# Patient Record
Sex: MALE | Race: WHITE | NOT HISPANIC OR LATINO | Employment: FULL TIME | URBAN - METROPOLITAN AREA
[De-identification: names, ages, dates, MRNs, and addresses within clinical notes are randomized per-mention and may not be internally consistent; named-entity substitution may affect disease eponyms.]

---

## 2017-05-07 ENCOUNTER — HOSPITAL ENCOUNTER (EMERGENCY)
Facility: MEDICAL CENTER | Age: 52
End: 2017-05-07
Attending: EMERGENCY MEDICINE
Payer: COMMERCIAL

## 2017-05-07 VITALS
RESPIRATION RATE: 18 BRPM | OXYGEN SATURATION: 91 % | TEMPERATURE: 98 F | BODY MASS INDEX: 42.28 KG/M2 | SYSTOLIC BLOOD PRESSURE: 156 MMHG | DIASTOLIC BLOOD PRESSURE: 114 MMHG | WEIGHT: 302.03 LBS | HEART RATE: 110 BPM | HEIGHT: 71 IN

## 2017-05-07 DIAGNOSIS — F10.930 ALCOHOL WITHDRAWAL, UNCOMPLICATED (HCC): ICD-10-CM

## 2017-05-07 LAB
ALBUMIN SERPL BCP-MCNC: 4.4 G/DL (ref 3.2–4.9)
ALBUMIN/GLOB SERPL: 1.3 G/DL
ALP SERPL-CCNC: 94 U/L (ref 30–99)
ALT SERPL-CCNC: 42 U/L (ref 2–50)
AMPHETAMINES UR QL: NEGATIVE
ANION GAP SERPL CALC-SCNC: 16 MMOL/L (ref 0–11.9)
AST SERPL-CCNC: 61 U/L (ref 12–45)
BARBITURATES UR QL SCN: NEGATIVE
BASOPHILS # BLD AUTO: 0.8 % (ref 0–1.8)
BASOPHILS # BLD: 0.05 K/UL (ref 0–0.12)
BENZODIAZ UR QL SCN: NEGATIVE
BILIRUB SERPL-MCNC: 1.1 MG/DL (ref 0.1–1.5)
BUN SERPL-MCNC: 11 MG/DL (ref 8–22)
BZE UR QL SCN: NEGATIVE
CALCIUM SERPL-MCNC: 9.6 MG/DL (ref 8.4–10.2)
CHLORIDE SERPL-SCNC: 102 MMOL/L (ref 96–112)
CO2 SERPL-SCNC: 23 MMOL/L (ref 20–33)
CREAT SERPL-MCNC: 0.83 MG/DL (ref 0.5–1.4)
EKG IMPRESSION: NORMAL
EOSINOPHIL # BLD AUTO: 0.01 K/UL (ref 0–0.51)
EOSINOPHIL NFR BLD: 0.2 % (ref 0–6.9)
ERYTHROCYTE [DISTWIDTH] IN BLOOD BY AUTOMATED COUNT: 60.1 FL (ref 35.9–50)
GFR SERPL CREATININE-BSD FRML MDRD: >60 ML/MIN/1.73 M 2
GLOBULIN SER CALC-MCNC: 3.3 G/DL (ref 1.9–3.5)
GLUCOSE SERPL-MCNC: 137 MG/DL (ref 65–99)
HCT VFR BLD AUTO: 45.5 % (ref 42–52)
HGB BLD-MCNC: 16 G/DL (ref 14–18)
IMM GRANULOCYTES # BLD AUTO: 0.02 K/UL (ref 0–0.11)
IMM GRANULOCYTES NFR BLD AUTO: 0.3 % (ref 0–0.9)
LIPASE SERPL-CCNC: 161 U/L (ref 7–58)
LYMPHOCYTES # BLD AUTO: 0.89 K/UL (ref 1–4.8)
LYMPHOCYTES NFR BLD: 13.5 % (ref 22–41)
MAGNESIUM SERPL-MCNC: 1.7 MG/DL (ref 1.5–2.5)
MCH RBC QN AUTO: 38.9 PG (ref 27–33)
MCHC RBC AUTO-ENTMCNC: 35.2 G/DL (ref 33.7–35.3)
MCV RBC AUTO: 110.7 FL (ref 81.4–97.8)
MONOCYTES # BLD AUTO: 0.65 K/UL (ref 0–0.85)
MONOCYTES NFR BLD AUTO: 9.8 % (ref 0–13.4)
NEUTROPHILS # BLD AUTO: 4.98 K/UL (ref 1.82–7.42)
NEUTROPHILS NFR BLD: 75.4 % (ref 44–72)
NRBC # BLD AUTO: 0 K/UL
NRBC BLD AUTO-RTO: 0 /100 WBC
PCP UR QL SCN: NEGATIVE
PHOSPHATE SERPL-MCNC: 1.9 MG/DL (ref 2.5–4.5)
PLATELET # BLD AUTO: 128 K/UL (ref 164–446)
PMV BLD AUTO: 9.1 FL (ref 9–12.9)
POTASSIUM SERPL-SCNC: 3.7 MMOL/L (ref 3.6–5.5)
PROT SERPL-MCNC: 7.7 G/DL (ref 6–8.2)
RBC # BLD AUTO: 4.11 M/UL (ref 4.7–6.1)
SODIUM SERPL-SCNC: 141 MMOL/L (ref 135–145)
TROPONIN I SERPL-MCNC: <0.02 NG/ML (ref 0–0.04)
UR OPIATES 2659: NEGATIVE
UR THC 2511T: NEGATIVE
UR TRICYCLIC 2660: NEGATIVE
WBC # BLD AUTO: 6.6 K/UL (ref 4.8–10.8)

## 2017-05-07 PROCEDURE — A9270 NON-COVERED ITEM OR SERVICE: HCPCS | Performed by: EMERGENCY MEDICINE

## 2017-05-07 PROCEDURE — 96375 TX/PRO/DX INJ NEW DRUG ADDON: CPT

## 2017-05-07 PROCEDURE — 93005 ELECTROCARDIOGRAM TRACING: CPT

## 2017-05-07 PROCEDURE — 700111 HCHG RX REV CODE 636 W/ 250 OVERRIDE (IP): Performed by: EMERGENCY MEDICINE

## 2017-05-07 PROCEDURE — 80053 COMPREHEN METABOLIC PANEL: CPT

## 2017-05-07 PROCEDURE — 83690 ASSAY OF LIPASE: CPT

## 2017-05-07 PROCEDURE — 84100 ASSAY OF PHOSPHORUS: CPT

## 2017-05-07 PROCEDURE — 700102 HCHG RX REV CODE 250 W/ 637 OVERRIDE(OP): Performed by: EMERGENCY MEDICINE

## 2017-05-07 PROCEDURE — 36415 COLL VENOUS BLD VENIPUNCTURE: CPT

## 2017-05-07 PROCEDURE — 83735 ASSAY OF MAGNESIUM: CPT

## 2017-05-07 PROCEDURE — 80305 DRUG TEST PRSMV DIR OPT OBS: CPT

## 2017-05-07 PROCEDURE — 85025 COMPLETE CBC W/AUTO DIFF WBC: CPT

## 2017-05-07 PROCEDURE — 700101 HCHG RX REV CODE 250: Performed by: EMERGENCY MEDICINE

## 2017-05-07 PROCEDURE — 96365 THER/PROPH/DIAG IV INF INIT: CPT

## 2017-05-07 PROCEDURE — 99284 EMERGENCY DEPT VISIT MOD MDM: CPT

## 2017-05-07 PROCEDURE — 84484 ASSAY OF TROPONIN QUANT: CPT

## 2017-05-07 RX ORDER — ONDANSETRON 2 MG/ML
4 INJECTION INTRAMUSCULAR; INTRAVENOUS ONCE
Status: COMPLETED | OUTPATIENT
Start: 2017-05-07 | End: 2017-05-07

## 2017-05-07 RX ORDER — LORAZEPAM 2 MG/ML
2 INJECTION INTRAMUSCULAR ONCE
Status: COMPLETED | OUTPATIENT
Start: 2017-05-07 | End: 2017-05-07

## 2017-05-07 RX ORDER — LORAZEPAM 1 MG/1
1 TABLET ORAL ONCE
Status: COMPLETED | OUTPATIENT
Start: 2017-05-07 | End: 2017-05-07

## 2017-05-07 RX ADMIN — LORAZEPAM 2 MG: 2 INJECTION INTRAMUSCULAR; INTRAVENOUS at 16:33

## 2017-05-07 RX ADMIN — ONDANSETRON 4 MG: 2 INJECTION INTRAMUSCULAR; INTRAVENOUS at 16:33

## 2017-05-07 RX ADMIN — THIAMINE HYDROCHLORIDE 1000 ML: 100 INJECTION, SOLUTION INTRAMUSCULAR; INTRAVENOUS at 16:32

## 2017-05-07 RX ADMIN — LORAZEPAM 1 MG: 1 TABLET ORAL at 17:15

## 2017-05-07 ASSESSMENT — PAIN SCALES - GENERAL: PAINLEVEL_OUTOF10: 0

## 2017-05-07 NOTE — ED AVS SNAPSHOT
5/7/2017    Corona Drummond  [address] [place] [address]    Dear Corona:    [facility] wants to ensure your discharge home is safe and you or your loved ones have had all of your questions answered regarding your care after you leave the hospital.    Below is a list of resources and contact information should you have any questions regarding your hospital stay, follow-up instructions, or active medical symptoms.    Questions or Concerns Regarding… Contact   Medical Questions Related to Your Discharge  (7 days a week, 8am-5pm) Contact a Nurse Care Coordinator   [phone]   Medical Questions Not Related to Your Discharge  (24 hours a day / 7 days a week)  Contact the Nurse Health Line   [phone]    Medications or Discharge Instructions Refer to your discharge packet   or contact your [facility] Primary Care Provider   [phone]   Follow-up Appointment(s) Schedule your appointment via eduplanet KK   or contact Scheduling [phone]   Billing Review your statement via eduplanet KK  or contact Billing [phone]   Medical Records Review your records via eduplanet KK   or contact Medical Records [phone]     You may receive a telephone call within two days of discharge. This call is to make certain you understand your discharge instructions and have the opportunity to have any questions answered. You can also easily access your medical information, test results and upcoming appointments via the eduplanet KK free online health management tool. You can learn more and sign up at [email or web address]/eduplanet KK. For assistance setting up your eduplanet KK account, please call [phone].    Once again, we want to ensure your discharge home is safe and that you have a clear understanding of any next steps in your care. If you have any questions or concerns, please do not hesitate to contact us, we are here for you. Thank you for choosing [facility] for your healthcare needs.    Sincerely,    Your [facility] Healthcare Team

## 2017-05-07 NOTE — ED PROVIDER NOTES
"ED Provider Note    CHIEF COMPLAINT  Chief Complaint   Patient presents with   • Medical Clearance       HPI  Corona Drummond is a 51 y.o. male who presents complaining of alcohol dependence. Patient was sent here from [facility] for \"medical clearance.\" The staff at [place] was concerned when the patient had an elevated heart rate and blood pressure. Patient denies any abdominal pain, chest pain, shortness of breath, dizziness, fever, chills. Patient admits to having nausea and vomited while here in the department. Patient denies hematemesis and melena. Last alcohol drink was last evening.    Patient believes he has had episodes of gout but denies other medical problems.    Patient states he has been drinking \"heavily\" since 2009. He quit beer but continues to drink vodka and wine on a daily basis. He states he has 4-6 drinks per day.    ALLERGIES  No Known Allergies    CURRENT MEDICATIONS  Home Medications     Reviewed by Leila Givens (Pharmacy Tech) on 05/07/17 at 1612  Med List Status: Complete    Medication Last Dose Status          Patient Matthew Taking any Medications                        PAST MEDICAL HISTORY     Presumptive gout    SURGICAL HISTORY  patient denies any surgical history    SOCIAL HISTORY  Social History     Social History Main Topics   • Smoking status: Never Smoker    • Smokeless tobacco: Never Used   • Alcohol Use: No   • Drug Use: No   • Sexual Activity: Not on file     Here with his significant other and mother      REVIEW OF SYSTEMS  See HPI for further details.  All other systems are negative except as above in HPI.      PHYSICAL EXAM  VITAL SIGNS: /114 mmHg  Pulse 125  Temp(Src) 36.7 °C (98 °F)  Resp 18  Ht 1.803 m (5' 11\")  Wt 137 kg (302 lb 0.5 oz)  BMI 42.14 kg/m2  SpO2 95%    General:  WD obese, diaphoretic, anxious; A+Ox3; V/S as above   Skin: warm and moist; good color; no rash  HEENT: NCAT; EOMs intact; PERRL; no scleral icterus   Neck: FROM; no " meningismus, no JVD  Cardiovascular: Tachycardic heart rate and regular rhythm.  No murmurs, rubs, or gallops; pulses 2+ bilaterally radially and DP areas  Lungs: Clear to auscultation with good air movement bilaterally.  No wheezes, rhonchi, or rales.   Abdomen: BS present; soft; NTND; no rebound, guarding, or rigidity.  No organomegaly or pulsatile mass; no CVAT   Extremities: KHANNA x 4; no e/o trauma; no pedal edema  Neurologic: CNs III-XII grossly intact; speech clear; distal sensation intact; strength 5/5 UE/LEs; tremulous  Psychiatric: Appropriate affect, anxious mood    LABS  Results for orders placed or performed during the hospital encounter of 05/07/17   UR DRUG SCREEN(SO BERRY ONLY)   Result Value Ref Range    Phencyclidine -Pcp Negative Negative    Benzodiazepines Negative Negative    Cocaine Metabolite Negative Negative    Amphetamines By Triage Negative Negative    Urine THC Negative Negative    Codeine-Morphine Negative Negative    Barbiturates Negative Negative    Tricyclic Antidepressants Negative Negative   CBC WITH DIFFERENTIAL   Result Value Ref Range    WBC 6.6 4.8 - 10.8 K/uL    RBC 4.11 (L) 4.70 - 6.10 M/uL    Hemoglobin 16.0 14.0 - 18.0 g/dL    Hematocrit 45.5 42.0 - 52.0 %    .7 (H) 81.4 - 97.8 fL    MCH 38.9 (H) 27.0 - 33.0 pg    MCHC 35.2 33.7 - 35.3 g/dL    RDW 60.1 (H) 35.9 - 50.0 fL    Platelet Count 128 (L) 164 - 446 K/uL    MPV 9.1 9.0 - 12.9 fL    Neutrophils-Polys 75.40 (H) 44.00 - 72.00 %    Lymphocytes 13.50 (L) 22.00 - 41.00 %    Monocytes 9.80 0.00 - 13.40 %    Eosinophils 0.20 0.00 - 6.90 %    Basophils 0.80 0.00 - 1.80 %    Immature Granulocytes 0.30 0.00 - 0.90 %    Nucleated RBC 0.00 /100 WBC    Neutrophils (Absolute) 4.98 1.82 - 7.42 K/uL    Lymphs (Absolute) 0.89 (L) 1.00 - 4.80 K/uL    Monos (Absolute) 0.65 0.00 - 0.85 K/uL    Eos (Absolute) 0.01 0.00 - 0.51 K/uL    Baso (Absolute) 0.05 0.00 - 0.12 K/uL    Immature Granulocytes (abs) 0.02 0.00 - 0.11 K/uL    NRBC  (Absolute) 0.00 K/uL   CMP   Result Value Ref Range    Sodium 141 135 - 145 mmol/L    Potassium 3.7 3.6 - 5.5 mmol/L    Chloride 102 96 - 112 mmol/L    Co2 23 20 - 33 mmol/L    Anion Gap 16.0 (H) 0.0 - 11.9    Glucose 137 (H) 65 - 99 mg/dL    Bun 11 8 - 22 mg/dL    Creatinine 0.83 0.50 - 1.40 mg/dL    Calcium 9.6 8.4 - 10.2 mg/dL    AST(SGOT) 61 (H) 12 - 45 U/L    ALT(SGPT) 42 2 - 50 U/L    Alkaline Phosphatase 94 30 - 99 U/L    Total Bilirubin 1.1 0.1 - 1.5 mg/dL    Albumin 4.4 3.2 - 4.9 g/dL    Total Protein 7.7 6.0 - 8.2 g/dL    Globulin 3.3 1.9 - 3.5 g/dL    A-G Ratio 1.3 g/dL   MAGNESIUM   Result Value Ref Range    Magnesium 1.7 1.5 - 2.5 mg/dL   PHOSPHORUS   Result Value Ref Range    Phosphorus 1.9 (L) 2.5 - 4.5 mg/dL   LIPASE   Result Value Ref Range    Lipase 161 (H) 7 - 58 U/L   TROPONIN   Result Value Ref Range    Troponin I <0.02 0.00 - 0.04 ng/mL   ESTIMATED GFR   Result Value Ref Range    GFR If African American >60 >60 mL/min/1.73 m 2    GFR If Non African American >60 >60 mL/min/1.73 m 2   EKG (ER)   Result Value Ref Range    Report       [facility] Emergency Dept.    Test Date:  2017  Pt Name:    APRIL SHARP                Department: [facility]  MRN:        [id number]                      Room:  Gender:     M                            Technician: [id number]  :        1965                   Requested By:ER TRIAGE PROTOCOL  Order #:    [id number]                    Reading MD:    Measurements  Intervals                                Axis  Rate:       116                          P:          57  KS:         170                          QRS:        10  QRSD:       80                           T:  QT:         375  QTc:        522    Interpretive Statements  Sinus tachycardia  Borderline T abnormalities, inferior leads  Prolonged QT interval  No previous ECG available for comparison         EKG  12 Lead EKG obtained at 1345 and interpreted by me to show:  Rhythm: Sinus  tachycardic rhythm   Rate: 116  Intervals:   TN: 170  QRS: 80  QTC: 522 and prolonged  No ectopy  Normal axis  No ST changes  Clinical Impression: Sinus tachycardia with QT prolongation  [facility]     MEDICAL RECORD  I have reviewed patient's medical record and pertinent results are listed below.      COURSE & MEDICAL DECISION MAKING  I have reviewed any medical record information, laboratory studies and radiographic results as noted.    Corona Drummond is a 51 y.o. male who presents for abnormal vital signs when he presented to [place] for alcohol detox. Patient is tachycardic and hypertensive with tremors. These signs and symptoms are consistent with alcohol withdrawal. Patient denies any history of alcohol withdrawal seizures. He is not having any hallucinations. He denies SI/HI.    Patient was given a detox bag, Ativan and Zofran. We obtained a CBC, CMP, lipase, magnesium, phosphorus, troponin, and EKG.      Pt was re-evaluated at 5:34 PM  Heart rate now in the 100s with blood pressure in the 160s systolic  Patient states he is feeling better. He is tolerating water by mouth. We will discharge him to [place].  Labs demonstrates a glucose of 137, anion gap of 16, AST 61, lipase 161, and phosphorus of 1.9. These are consistent with alcohol use and mild dehydration.  We will fax paperwork to [place]    FINAL IMPRESSION  1. Alcohol withdrawal, uncomplicated (CMS-[facility])          Electronically signed by: Maria Antonia Benitez, 5/7/2017 3:39 PM

## 2017-05-07 NOTE — ED AVS SNAPSHOT
Home Care Instructions                                                                                                                Corona Drummond   MRN: [id number]    Department:  [facility], Emergency Dept   Date of Visit:  5/7/2017            [facility], Emergency Dept    [address] [facility] [address]    Phone:  [phone]      You were seen by     Maria Antonia Benitez M.D.      Your Diagnosis Was     Alcohol withdrawal, uncomplicated (CMS-HCC)     F10.230       These are the medications you received during your hospitalization from 05/07/2017 1351 to 05/07/2017 1749     Date/Time Order Dose Route Action    05/07/2017 1633 lorazepam (ATIVAN) injection 2 mg 2 mg Intravenous Given    05/07/2017 1632 er detox iv 1000 mL (D5LR + thiamine 100 mg + folic acid 1 mg + magnesium 1 g) infusion 1,000 mL Intravenous New Bag    05/07/2017 1633 ondansetron (ZOFRAN) syringe/vial injection 4 mg 4 mg Intravenous Given    05/07/2017 1715 lorazepam (ATIVAN) tablet 1 mg 1 mg Oral Given      Follow-up Information     1. Follow up with [facility], Emergency Dept.    Specialty:  Emergency Medicine    Why:  As needed, If symptoms worsen    Contact information    [address] [facility] [address] [phone]        2. Follow up with [facility]. Schedule an appointment as soon as possible for a visit in 1 week.    Contact information    [address] [address] [phone]        3. Follow up with [facility]. Schedule an appointment as soon as possible for a visit in 1 week.    Contact information    [address]  [place] [address] [phone]          4. Follow up with Anastasia Wilder M.D.. Schedule an appointment as soon as possible for a visit in 1 week.    Specialty:  Family Medicine    Contact information    [address] [facility]  [address]  [place] [address] [phone]        Medication Information      Review all of your home medications and newly ordered medications with your primary doctor and/or pharmacist as soon as possible. Follow medication instructions as directed by your doctor and/or pharmacist.     Please keep your complete medication list with you and share with your physician. Update the information when medications are discontinued, doses are changed, or new medications (including over-the-counter products) are added; and carry medication information at all times in the event of emergency situations.               Medication List      Notice     You have not been prescribed any medications.            Procedures and tests performed during your visit     CBC WITH DIFFERENTIAL    CMP    EKG (ER)    ESTIMATED GFR    LIPASE    MAGNESIUM    PHOSPHORUS    PO CHALLENGE    TROPONIN    UR DRUG SCREEN(SO BERRY ONLY)        Discharge Instructions       Go to [facility]  Return to ER for worsening issues      Alcohol Withdrawal  Alcohol withdrawal is a group of symptoms that can develop when a person who drinks heavily and regularly stops drinking or drinks less.  CAUSES  Heavy and regular drinking can cause chemicals that send signals from the brain to the body (neurotransmitters) to deactivate. Alcohol withdrawal develops when deactivated neurotransmitters reactivate because a person stops drinking or drinks less.  RISK FACTORS  The more a person drinks and the longer he or she drinks, the greater the risk of alcohol withdrawal. Severe withdrawal is more likely to develop in someone who:  · Had severe alcohol withdrawal in the past.  · Had a seizure during a previous episode of alcohol withdrawal.  · Is elderly.  · Is pregnant.  · Has been abusing drugs.  · Has other medical problems, including:  ¨ Infection.  ¨ Heart, lung, or liver disease.  ¨ Seizures.  ¨ Mental health problems.  SYMPTOMS  Symptoms of this condition can be mild to moderate, or they can be severe.  Mild to moderate symptoms may  include:  · Fatigue.  · Nightmares.  · Trouble sleeping.  · Depression.  · Anxiety.  · Inability to think clearly.  · Mood swings.  · Irritability.  · Loss of appetite.  · Nausea or vomiting.  · Clammy skin.  · Extreme sweating.  · Rapid heartbeat.  · Shakiness.  · Uncontrollable shaking (tremor).  Severe symptoms may include:  · Fever.  · Seizures.  · Severe confusion.  · Feeling or seeing things that are not there (hallucinations).  Symptoms usually begin within eight hours after a person stops drinking or drinks less. They can last for weeks.  DIAGNOSIS  Alcohol withdrawal is diagnosed with a medical history and physical exam. Sometimes, urine and blood tests are also done.  TREATMENT  Treatment may involve:  · Monitoring blood pressure, pulse, and breathing.  · Getting fluids through an IV tube.  · Medicine to reduce anxiety.  · Medicine to prevent or control seizures.  · Multivitamins and B vitamins.  · Having a health care provider check on you daily.  If symptoms are moderate to severe or if there is a risk of severe withdrawal, treatment may be done at a hospital or treatment center.  HOME CARE INSTRUCTIONS  · Take medicines and vitamin supplements only as directed by your health care provider.  · Do not drink alcohol.  · Have someone stay with you or be available if you need help.  · Drink enough fluid to keep your urine clear or pale yellow.  · Consider joining a 12-step program or another alcohol support group.  SEEK MEDICAL CARE IF:  · Your symptoms get worse or do not go away.  · You cannot keep food or water in your stomach.  · You are struggling with not drinking alcohol.  · You cannot stop drinking alcohol.  SEEK IMMEDIATE MEDICAL CARE IF:   · You have an irregular heartbeat.  · You have chest pain.  · You have trouble breathing.  · You have symptoms of severe withdrawal, such as:  ¨ A fever.  ¨ Seizures.  ¨ Severe confusion.  ¨ Hallucinations.     This information is not intended to replace advice  given to you by your health care provider. Make sure you discuss any questions you have with your health care provider.     Document Released: 09/27/2006 Document Revised: 01/08/2016 Document Reviewed: 10/06/2015  Elsevier Interactive Patient Education ©2016 [employer]            Patient Information     Patient Information    Following emergency treatment: all patient requiring follow-up care must return either to a private physician or a clinic if your condition worsens before you are able to obtain further medical attention, please return to the emergency room.     Billing Information    At [facility], we work to make the billing process streamlined for our patients.  Our Representatives are here to answer any questions you may have regarding your hospital bill.  If you have insurance coverage and have supplied your insurance information to us, we will submit a claim to your insurer on your behalf.  Should you have any questions regarding your bill, we can be reached online or by phone as follows:  Online: You are able pay your bills online or live chat with our representatives about any billing questions you may have. We are here to help Monday - Friday from 8:00am to 7:30pm and 9:00am - 12:00pm on Saturdays.  Please visit [email or web address]  for more information.   Phone:  [phone] or [phone]    Please note that your emergency physician, surgeon, pathologist, radiologist, anesthesiologist, and other specialists are not employed by [facility] and will therefore bill separately for their services.  Please contact them directly for any questions concerning their bills at the numbers below:     Emergency Physician Services:  [phone]  [facility]:  [phone]  [facility]:  [phone]  [facility]:  [phone]    1. Your final bill may vary from the amount quoted upon discharge if all procedures are not  complete at that time, or if your doctor has additional procedures of which we are not aware. You will receive an additional bill if you return to the Emergency Department at [facility] for suture removal regardless of the facility of which the sutures were placed.     2. Please arrange for settlement of this account at the emergency registration.    3. All self-pay accounts are due in full at the time of treatment.  If you are unable to meet this obligation then payment is expected within 4-5 days.     4. If you have had radiology studies (CT, X-ray, Ultrasound, MRI), you have received a preliminary result during your emergency department visit. Please contact the radiology department [phone] to receive a copy of your final result. Please discuss the Final result with your primary physician or with the follow up physician provided.     Crisis Hotline:  [place] Crisis Hotline:  4-449-TDWIJDH or 1-395.762.9715  [place] Crisis Hotline:    1-337.743.4522 or 161-583-7119         ED Discharge Follow Up Questions    1. In order to provide you with very good care, we would like to follow up with a phone call in the next few days.  May we have your permission to contact you?     YES /  NO    2. What is the best phone number to call you? (       )_____-__________    3. What is the best time to call you?      Morning  /  Afternoon  /  Evening                   Patient Signature:  ____________________________________________________________    Date:  ____________________________________________________________

## 2017-05-07 NOTE — ED AVS SNAPSHOT
TellMi Access Code: [email or web address]  Expires: 6/6/2017  5:49 PM    TellMi  A secure, online tool to manage your health information     [employer]’s TellMi® is a secure, online tool that connects you to your personalized health information from the privacy of your home -- day or night - making it very easy for you to manage your healthcare. Once the activation process is completed, you can even access your medical information using the TellMi eliane, which is available for free in the Apple Eliane store or Google Play store.     TellMi provides the following levels of access (as shown below):   My Chart Features   [facility] Primary Care Doctor [facility]  Specialists [facility]  Urgent  Care Non-[facility]  Primary Care  Doctor   Email your healthcare team securely and privately 24/7 X X X X   Manage appointments: schedule your next appointment; view details of past/upcoming appointments X      Request prescription refills. X      View recent personal medical records, including lab and immunizations X X X X   View health record, including health history, allergies, medications X X X X   Read reports about your outpatient visits, procedures, consult and ER notes X X X X   See your discharge summary, which is a recap of your hospital and/or ER visit that includes your diagnosis, lab results, and care plan. X X       How to register for TellMi:  1. Go to  [email or web address].  2. Click on the Sign Up Now box, which takes you to the New Member Sign Up page. You will need to provide the following information:  a. Enter your TellMi Access Code exactly as it appears at the top of this page. (You will not need to use this code after you’ve completed the sign-up process. If you do not sign up before the expiration date, you must request a new code.)   b. Enter your date of birth.   c. Enter your home email address.   d. Click Submit, and follow the next screen’s instructions.  3. Create a TellMi ID. This will be your TellMi  login ID and cannot be changed, so think of one that is secure and easy to remember.  4. Create a InteraXon password. You can change your password at any time.  5. Enter your Password Reset Question and Answer. This can be used at a later time if you forget your password.   6. Enter your e-mail address. This allows you to receive e-mail notifications when new information is available in InteraXon.  7. Click Sign Up. You can now view your health information.    For assistance activating your InteraXon account, call [phone]

## 2017-05-08 NOTE — ED NOTES
Discharge instructions provided.  Pt verbalized the understanding of discharge instructions to follow up with PCP and to return to ER if condition worsens.  Pt ambulated out of ER without difficulty. Family to drive to [place]

## 2017-05-08 NOTE — DISCHARGE INSTRUCTIONS
Go to [facility]  Return to ER for worsening issues      Alcohol Withdrawal  Alcohol withdrawal is a group of symptoms that can develop when a person who drinks heavily and regularly stops drinking or drinks less.  CAUSES  Heavy and regular drinking can cause chemicals that send signals from the brain to the body (neurotransmitters) to deactivate. Alcohol withdrawal develops when deactivated neurotransmitters reactivate because a person stops drinking or drinks less.  RISK FACTORS  The more a person drinks and the longer he or she drinks, the greater the risk of alcohol withdrawal. Severe withdrawal is more likely to develop in someone who:  · Had severe alcohol withdrawal in the past.  · Had a seizure during a previous episode of alcohol withdrawal.  · Is elderly.  · Is pregnant.  · Has been abusing drugs.  · Has other medical problems, including:  ¨ Infection.  ¨ Heart, lung, or liver disease.  ¨ Seizures.  ¨ Mental health problems.  SYMPTOMS  Symptoms of this condition can be mild to moderate, or they can be severe.  Mild to moderate symptoms may include:  · Fatigue.  · Nightmares.  · Trouble sleeping.  · Depression.  · Anxiety.  · Inability to think clearly.  · Mood swings.  · Irritability.  · Loss of appetite.  · Nausea or vomiting.  · Clammy skin.  · Extreme sweating.  · Rapid heartbeat.  · Shakiness.  · Uncontrollable shaking (tremor).  Severe symptoms may include:  · Fever.  · Seizures.  · Severe confusion.  · Feeling or seeing things that are not there (hallucinations).  Symptoms usually begin within eight hours after a person stops drinking or drinks less. They can last for weeks.  DIAGNOSIS  Alcohol withdrawal is diagnosed with a medical history and physical exam. Sometimes, urine and blood tests are also done.  TREATMENT  Treatment may involve:  · Monitoring blood pressure, pulse, and breathing.  · Getting fluids through an IV tube.  · Medicine to reduce anxiety.  · Medicine to prevent or control  seizures.  · Multivitamins and B vitamins.  · Having a health care provider check on you daily.  If symptoms are moderate to severe or if there is a risk of severe withdrawal, treatment may be done at a hospital or treatment center.  HOME CARE INSTRUCTIONS  · Take medicines and vitamin supplements only as directed by your health care provider.  · Do not drink alcohol.  · Have someone stay with you or be available if you need help.  · Drink enough fluid to keep your urine clear or pale yellow.  · Consider joining a 12-step program or another alcohol support group.  SEEK MEDICAL CARE IF:  · Your symptoms get worse or do not go away.  · You cannot keep food or water in your stomach.  · You are struggling with not drinking alcohol.  · You cannot stop drinking alcohol.  SEEK IMMEDIATE MEDICAL CARE IF:   · You have an irregular heartbeat.  · You have chest pain.  · You have trouble breathing.  · You have symptoms of severe withdrawal, such as:  ¨ A fever.  ¨ Seizures.  ¨ Severe confusion.  ¨ Hallucinations.     This information is not intended to replace advice given to you by your health care provider. Make sure you discuss any questions you have with your health care provider.     Document Released: 09/27/2006 Document Revised: 01/08/2016 Document Reviewed: 10/06/2015  Elsevier Interactive Patient Education ©2016 [employer]

## 2023-09-07 ENCOUNTER — APPOINTMENT (OUTPATIENT)
Dept: RADIOLOGY | Facility: MEDICAL CENTER | Age: 58
DRG: 896 | End: 2023-09-07
Attending: STUDENT IN AN ORGANIZED HEALTH CARE EDUCATION/TRAINING PROGRAM

## 2023-09-07 ENCOUNTER — HOSPITAL ENCOUNTER (INPATIENT)
Facility: MEDICAL CENTER | Age: 58
LOS: 5 days | DRG: 896 | End: 2023-09-12
Attending: STUDENT IN AN ORGANIZED HEALTH CARE EDUCATION/TRAINING PROGRAM | Admitting: INTERNAL MEDICINE

## 2023-09-07 DIAGNOSIS — U07.1 COVID-19: ICD-10-CM

## 2023-09-07 DIAGNOSIS — F10.931 DELIRIUM TREMENS (HCC): ICD-10-CM

## 2023-09-07 DIAGNOSIS — H10.33 ACUTE CONJUNCTIVITIS OF BOTH EYES, UNSPECIFIED ACUTE CONJUNCTIVITIS TYPE: ICD-10-CM

## 2023-09-07 DIAGNOSIS — R44.1 VISUAL HALLUCINATIONS: ICD-10-CM

## 2023-09-07 DIAGNOSIS — E87.6 HYPOKALEMIA: ICD-10-CM

## 2023-09-07 PROBLEM — R00.0 TACHYCARDIA: Status: ACTIVE | Noted: 2023-09-07

## 2023-09-07 PROBLEM — F10.221 ALCOHOL INTOXICATION DELIRIUM WITH MODERATE OR SEVERE USE DISORDER (HCC): Status: ACTIVE | Noted: 2023-09-07

## 2023-09-07 PROBLEM — K70.10 ALCOHOLIC HEPATITIS: Status: ACTIVE | Noted: 2023-09-07

## 2023-09-07 PROBLEM — E87.1 HYPONATREMIA: Status: ACTIVE | Noted: 2023-09-07

## 2023-09-07 PROBLEM — H10.9 CONJUNCTIVITIS OF BOTH EYES: Status: ACTIVE | Noted: 2023-09-07

## 2023-09-07 LAB
ALBUMIN SERPL BCP-MCNC: 4.8 G/DL (ref 3.2–4.9)
ALBUMIN/GLOB SERPL: 1.3 G/DL
ALP SERPL-CCNC: 100 U/L (ref 30–99)
ALT SERPL-CCNC: 97 U/L (ref 2–50)
AMMONIA PLAS-SCNC: 32 UMOL/L (ref 11–45)
ANION GAP SERPL CALC-SCNC: 21 MMOL/L (ref 7–16)
APTT PPP: 24.9 SEC (ref 24.7–36)
AST SERPL-CCNC: 142 U/L (ref 12–45)
BASOPHILS # BLD AUTO: 0.6 % (ref 0–1.8)
BASOPHILS # BLD: 0.05 K/UL (ref 0–0.12)
BILIRUB SERPL-MCNC: 1 MG/DL (ref 0.1–1.5)
BUN SERPL-MCNC: 22 MG/DL (ref 8–22)
CALCIUM ALBUM COR SERPL-MCNC: 9.6 MG/DL (ref 8.5–10.5)
CALCIUM SERPL-MCNC: 10.2 MG/DL (ref 8.5–10.5)
CHLORIDE SERPL-SCNC: 92 MMOL/L (ref 96–112)
CO2 SERPL-SCNC: 20 MMOL/L (ref 20–33)
CREAT SERPL-MCNC: 1.33 MG/DL (ref 0.5–1.4)
EOSINOPHIL # BLD AUTO: 0.01 K/UL (ref 0–0.51)
EOSINOPHIL NFR BLD: 0.1 % (ref 0–6.9)
ERYTHROCYTE [DISTWIDTH] IN BLOOD BY AUTOMATED COUNT: 51.5 FL (ref 35.9–50)
GFR SERPLBLD CREATININE-BSD FMLA CKD-EPI: 62 ML/MIN/1.73 M 2
GLOBULIN SER CALC-MCNC: 3.8 G/DL (ref 1.9–3.5)
GLUCOSE SERPL-MCNC: 99 MG/DL (ref 65–99)
HCT VFR BLD AUTO: 57.9 % (ref 42–52)
HGB BLD-MCNC: 19.7 G/DL (ref 14–18)
IMM GRANULOCYTES # BLD AUTO: 0.05 K/UL (ref 0–0.11)
IMM GRANULOCYTES NFR BLD AUTO: 0.6 % (ref 0–0.9)
INR PPP: 0.97 (ref 0.87–1.13)
LIPASE SERPL-CCNC: 29 U/L (ref 11–82)
LYMPHOCYTES # BLD AUTO: 0.53 K/UL (ref 1–4.8)
LYMPHOCYTES NFR BLD: 6.6 % (ref 22–41)
MAGNESIUM SERPL-MCNC: 1.7 MG/DL (ref 1.5–2.5)
MCH RBC QN AUTO: 33.7 PG (ref 27–33)
MCHC RBC AUTO-ENTMCNC: 34 G/DL (ref 32.3–36.5)
MCV RBC AUTO: 99 FL (ref 81.4–97.8)
MONOCYTES # BLD AUTO: 1.32 K/UL (ref 0–0.85)
MONOCYTES NFR BLD AUTO: 16.6 % (ref 0–13.4)
NEUTROPHILS # BLD AUTO: 6.01 K/UL (ref 1.82–7.42)
NEUTROPHILS NFR BLD: 75.5 % (ref 44–72)
NRBC # BLD AUTO: 0 K/UL
NRBC BLD-RTO: 0 /100 WBC (ref 0–0.2)
PHOSPHATE SERPL-MCNC: 3.3 MG/DL (ref 2.5–4.5)
PLATELET # BLD AUTO: 90 K/UL (ref 164–446)
PLATELETS.RETICULATED NFR BLD AUTO: 8.4 % (ref 0.6–13.1)
PMV BLD AUTO: 10.8 FL (ref 9–12.9)
POTASSIUM SERPL-SCNC: 3.3 MMOL/L (ref 3.6–5.5)
PROT SERPL-MCNC: 8.6 G/DL (ref 6–8.2)
PROTHROMBIN TIME: 13 SEC (ref 12–14.6)
RBC # BLD AUTO: 5.85 M/UL (ref 4.7–6.1)
SODIUM SERPL-SCNC: 133 MMOL/L (ref 135–145)
TROPONIN T SERPL-MCNC: 23 NG/L (ref 6–19)
WBC # BLD AUTO: 8 K/UL (ref 4.8–10.8)

## 2023-09-07 PROCEDURE — 85055 RETICULATED PLATELET ASSAY: CPT

## 2023-09-07 PROCEDURE — 700101 HCHG RX REV CODE 250: Performed by: STUDENT IN AN ORGANIZED HEALTH CARE EDUCATION/TRAINING PROGRAM

## 2023-09-07 PROCEDURE — 82140 ASSAY OF AMMONIA: CPT

## 2023-09-07 PROCEDURE — 83735 ASSAY OF MAGNESIUM: CPT

## 2023-09-07 PROCEDURE — 8E0ZXY6 ISOLATION: ICD-10-PCS | Performed by: INTERNAL MEDICINE

## 2023-09-07 PROCEDURE — 770022 HCHG ROOM/CARE - ICU (200)

## 2023-09-07 PROCEDURE — 84100 ASSAY OF PHOSPHORUS: CPT

## 2023-09-07 PROCEDURE — 85025 COMPLETE CBC W/AUTO DIFF WBC: CPT

## 2023-09-07 PROCEDURE — 94760 N-INVAS EAR/PLS OXIMETRY 1: CPT

## 2023-09-07 PROCEDURE — 700102 HCHG RX REV CODE 250 W/ 637 OVERRIDE(OP): Mod: JZ | Performed by: STUDENT IN AN ORGANIZED HEALTH CARE EDUCATION/TRAINING PROGRAM

## 2023-09-07 PROCEDURE — 99223 1ST HOSP IP/OBS HIGH 75: CPT | Performed by: STUDENT IN AN ORGANIZED HEALTH CARE EDUCATION/TRAINING PROGRAM

## 2023-09-07 PROCEDURE — 96367 TX/PROPH/DG ADDL SEQ IV INF: CPT

## 2023-09-07 PROCEDURE — 93005 ELECTROCARDIOGRAM TRACING: CPT | Performed by: STUDENT IN AN ORGANIZED HEALTH CARE EDUCATION/TRAINING PROGRAM

## 2023-09-07 PROCEDURE — 700111 HCHG RX REV CODE 636 W/ 250 OVERRIDE (IP): Mod: JZ | Performed by: STUDENT IN AN ORGANIZED HEALTH CARE EDUCATION/TRAINING PROGRAM

## 2023-09-07 PROCEDURE — 85730 THROMBOPLASTIN TIME PARTIAL: CPT

## 2023-09-07 PROCEDURE — 80053 COMPREHEN METABOLIC PANEL: CPT

## 2023-09-07 PROCEDURE — 96366 THER/PROPH/DIAG IV INF ADDON: CPT

## 2023-09-07 PROCEDURE — 96375 TX/PRO/DX INJ NEW DRUG ADDON: CPT

## 2023-09-07 PROCEDURE — 83690 ASSAY OF LIPASE: CPT

## 2023-09-07 PROCEDURE — A9270 NON-COVERED ITEM OR SERVICE: HCPCS | Mod: JZ | Performed by: STUDENT IN AN ORGANIZED HEALTH CARE EDUCATION/TRAINING PROGRAM

## 2023-09-07 PROCEDURE — 700105 HCHG RX REV CODE 258: Performed by: STUDENT IN AN ORGANIZED HEALTH CARE EDUCATION/TRAINING PROGRAM

## 2023-09-07 PROCEDURE — 71045 X-RAY EXAM CHEST 1 VIEW: CPT

## 2023-09-07 PROCEDURE — 85610 PROTHROMBIN TIME: CPT

## 2023-09-07 PROCEDURE — 36415 COLL VENOUS BLD VENIPUNCTURE: CPT

## 2023-09-07 PROCEDURE — 84484 ASSAY OF TROPONIN QUANT: CPT

## 2023-09-07 PROCEDURE — 99291 CRITICAL CARE FIRST HOUR: CPT

## 2023-09-07 RX ORDER — PROMETHAZINE HYDROCHLORIDE 25 MG/1
12.5-25 SUPPOSITORY RECTAL EVERY 4 HOURS PRN
Status: DISCONTINUED | OUTPATIENT
Start: 2023-09-07 | End: 2023-09-12 | Stop reason: HOSPADM

## 2023-09-07 RX ORDER — POLYMYXIN B SULFATE AND TRIMETHOPRIM 1; 10000 MG/ML; [USP'U]/ML
1 SOLUTION OPHTHALMIC EVERY 4 HOURS
Status: DISCONTINUED | OUTPATIENT
Start: 2023-09-08 | End: 2023-09-12 | Stop reason: HOSPADM

## 2023-09-07 RX ORDER — FOLIC ACID 1 MG/1
1 TABLET ORAL DAILY
Status: DISCONTINUED | OUTPATIENT
Start: 2023-09-08 | End: 2023-09-12 | Stop reason: HOSPADM

## 2023-09-07 RX ORDER — LORAZEPAM 2 MG/1
2 TABLET ORAL
Status: DISCONTINUED | OUTPATIENT
Start: 2023-09-07 | End: 2023-09-08

## 2023-09-07 RX ORDER — PHENOBARBITAL SODIUM 130 MG/ML
260 INJECTION, SOLUTION INTRAMUSCULAR; INTRAVENOUS ONCE
Status: COMPLETED | OUTPATIENT
Start: 2023-09-07 | End: 2023-09-07

## 2023-09-07 RX ORDER — LORAZEPAM 2 MG/ML
1 INJECTION INTRAMUSCULAR
Status: DISCONTINUED | OUTPATIENT
Start: 2023-09-07 | End: 2023-09-08

## 2023-09-07 RX ORDER — POTASSIUM CHLORIDE 7.45 MG/ML
10 INJECTION INTRAVENOUS
Status: DISPENSED | OUTPATIENT
Start: 2023-09-08 | End: 2023-09-08

## 2023-09-07 RX ORDER — BISACODYL 10 MG
10 SUPPOSITORY, RECTAL RECTAL
Status: DISCONTINUED | OUTPATIENT
Start: 2023-09-07 | End: 2023-09-12 | Stop reason: HOSPADM

## 2023-09-07 RX ORDER — LORAZEPAM 2 MG/1
4 TABLET ORAL
Status: DISCONTINUED | OUTPATIENT
Start: 2023-09-07 | End: 2023-09-08

## 2023-09-07 RX ORDER — PROCHLORPERAZINE EDISYLATE 5 MG/ML
5-10 INJECTION INTRAMUSCULAR; INTRAVENOUS EVERY 4 HOURS PRN
Status: DISCONTINUED | OUTPATIENT
Start: 2023-09-07 | End: 2023-09-12 | Stop reason: HOSPADM

## 2023-09-07 RX ORDER — LORAZEPAM 1 MG/1
1 TABLET ORAL EVERY 4 HOURS PRN
Status: DISCONTINUED | OUTPATIENT
Start: 2023-09-07 | End: 2023-09-08

## 2023-09-07 RX ORDER — LORAZEPAM 1 MG/1
0.5 TABLET ORAL EVERY 4 HOURS PRN
Status: DISCONTINUED | OUTPATIENT
Start: 2023-09-07 | End: 2023-09-08

## 2023-09-07 RX ORDER — ERYTHROMYCIN 5 MG/G
1 OINTMENT OPHTHALMIC EVERY 6 HOURS
Status: DISCONTINUED | OUTPATIENT
Start: 2023-09-07 | End: 2023-09-12 | Stop reason: HOSPADM

## 2023-09-07 RX ORDER — DIAZEPAM 5 MG/ML
5 INJECTION, SOLUTION INTRAMUSCULAR; INTRAVENOUS ONCE
Status: COMPLETED | OUTPATIENT
Start: 2023-09-07 | End: 2023-09-07

## 2023-09-07 RX ORDER — GAUZE BANDAGE 2" X 2"
100 BANDAGE TOPICAL DAILY
COMMUNITY

## 2023-09-07 RX ORDER — SODIUM CHLORIDE 9 MG/ML
INJECTION, SOLUTION INTRAVENOUS CONTINUOUS
Status: DISCONTINUED | OUTPATIENT
Start: 2023-09-08 | End: 2023-09-08

## 2023-09-07 RX ORDER — LORAZEPAM 2 MG/ML
0.5 INJECTION INTRAMUSCULAR EVERY 4 HOURS PRN
Status: DISCONTINUED | OUTPATIENT
Start: 2023-09-07 | End: 2023-09-08

## 2023-09-07 RX ORDER — LORAZEPAM 2 MG/ML
1.5 INJECTION INTRAMUSCULAR
Status: DISCONTINUED | OUTPATIENT
Start: 2023-09-07 | End: 2023-09-08

## 2023-09-07 RX ORDER — ENOXAPARIN SODIUM 100 MG/ML
40 INJECTION SUBCUTANEOUS DAILY
Status: DISCONTINUED | OUTPATIENT
Start: 2023-09-08 | End: 2023-09-09

## 2023-09-07 RX ORDER — MAGNESIUM SULFATE HEPTAHYDRATE 40 MG/ML
2 INJECTION, SOLUTION INTRAVENOUS ONCE
Status: COMPLETED | OUTPATIENT
Start: 2023-09-07 | End: 2023-09-08

## 2023-09-07 RX ORDER — POTASSIUM CHLORIDE 20 MEQ/1
40 TABLET, EXTENDED RELEASE ORAL ONCE
Status: COMPLETED | OUTPATIENT
Start: 2023-09-07 | End: 2023-09-07

## 2023-09-07 RX ORDER — M-VIT,TX,IRON,MINS/CALC/FOLIC 27MG-0.4MG
1 TABLET ORAL DAILY
COMMUNITY

## 2023-09-07 RX ORDER — AMOXICILLIN 250 MG
2 CAPSULE ORAL 2 TIMES DAILY
Status: DISCONTINUED | OUTPATIENT
Start: 2023-09-08 | End: 2023-09-12 | Stop reason: HOSPADM

## 2023-09-07 RX ORDER — ACETAMINOPHEN 325 MG/1
650 TABLET ORAL EVERY 6 HOURS PRN
Status: DISCONTINUED | OUTPATIENT
Start: 2023-09-07 | End: 2023-09-10

## 2023-09-07 RX ORDER — FOLIC ACID 1 MG/1
1 TABLET ORAL DAILY
Status: DISCONTINUED | OUTPATIENT
Start: 2023-09-08 | End: 2023-09-07

## 2023-09-07 RX ORDER — SODIUM CHLORIDE 9 MG/ML
500 INJECTION, SOLUTION INTRAVENOUS ONCE
Status: COMPLETED | OUTPATIENT
Start: 2023-09-07 | End: 2023-09-07

## 2023-09-07 RX ORDER — ONDANSETRON 2 MG/ML
4 INJECTION INTRAMUSCULAR; INTRAVENOUS EVERY 4 HOURS PRN
Status: DISCONTINUED | OUTPATIENT
Start: 2023-09-07 | End: 2023-09-12 | Stop reason: HOSPADM

## 2023-09-07 RX ORDER — POTASSIUM CHLORIDE 20 MEQ/1
40 TABLET, EXTENDED RELEASE ORAL ONCE
Status: DISCONTINUED | OUTPATIENT
Start: 2023-09-08 | End: 2023-09-08

## 2023-09-07 RX ORDER — DIAZEPAM 5 MG/ML
5 INJECTION, SOLUTION INTRAMUSCULAR; INTRAVENOUS ONCE
Status: COMPLETED | OUTPATIENT
Start: 2023-09-07 | End: 2023-09-08

## 2023-09-07 RX ORDER — ONDANSETRON 4 MG/1
4 TABLET, ORALLY DISINTEGRATING ORAL EVERY 4 HOURS PRN
Status: DISCONTINUED | OUTPATIENT
Start: 2023-09-07 | End: 2023-09-12 | Stop reason: HOSPADM

## 2023-09-07 RX ORDER — LORAZEPAM 2 MG/ML
2 INJECTION INTRAMUSCULAR
Status: DISCONTINUED | OUTPATIENT
Start: 2023-09-07 | End: 2023-09-08

## 2023-09-07 RX ORDER — PROMETHAZINE HYDROCHLORIDE 25 MG/1
12.5-25 TABLET ORAL EVERY 4 HOURS PRN
Status: DISCONTINUED | OUTPATIENT
Start: 2023-09-07 | End: 2023-09-12 | Stop reason: HOSPADM

## 2023-09-07 RX ORDER — POLYETHYLENE GLYCOL 3350 17 G/17G
1 POWDER, FOR SOLUTION ORAL
Status: DISCONTINUED | OUTPATIENT
Start: 2023-09-07 | End: 2023-09-12 | Stop reason: HOSPADM

## 2023-09-07 RX ADMIN — DIAZEPAM 5 MG: 5 INJECTION, SOLUTION INTRAMUSCULAR; INTRAVENOUS at 21:05

## 2023-09-07 RX ADMIN — THIAMINE HYDROCHLORIDE 100 MG: 100 INJECTION, SOLUTION INTRAMUSCULAR; INTRAVENOUS at 21:05

## 2023-09-07 RX ADMIN — POTASSIUM CHLORIDE 40 MEQ: 1500 TABLET, EXTENDED RELEASE ORAL at 22:10

## 2023-09-07 RX ADMIN — ERYTHROMYCIN 1 APPLICATION: 5 OINTMENT OPHTHALMIC at 21:04

## 2023-09-07 RX ADMIN — PHENOBARBITAL SODIUM 260 MG: 130 INJECTION INTRAMUSCULAR; INTRAVENOUS at 21:04

## 2023-09-07 RX ADMIN — SODIUM CHLORIDE 500 ML: 9 INJECTION, SOLUTION INTRAVENOUS at 21:03

## 2023-09-07 RX ADMIN — MAGNESIUM SULFATE HEPTAHYDRATE 2 G: 2 INJECTION, SOLUTION INTRAVENOUS at 22:10

## 2023-09-07 ASSESSMENT — LIFESTYLE VARIABLES
AVERAGE NUMBER OF DAYS PER WEEK YOU HAVE A DRINK CONTAINING ALCOHOL: 4
VISUAL DISTURBANCES: MILD SENSITIVITY
PAROXYSMAL SWEATS: *
TOTAL SCORE: 8
TOTAL SCORE: 3
AUDITORY DISTURBANCES: NOT PRESENT
HEADACHE, FULLNESS IN HEAD: NOT PRESENT
EVER FELT BAD OR GUILTY ABOUT YOUR DRINKING: YES
ON A TYPICAL DAY WHEN YOU DRINK ALCOHOL HOW MANY DRINKS DO YOU HAVE: 3
NAUSEA AND VOMITING: NO NAUSEA AND NO VOMITING
EVER HAD A DRINK FIRST THING IN THE MORNING TO STEADY YOUR NERVES TO GET RID OF A HANGOVER: YES
HOW MANY TIMES IN THE PAST YEAR HAVE YOU HAD 5 OR MORE DRINKS IN A DAY: 0
TREMOR: TREMOR NOT VISIBLE BUT CAN BE FELT, FINGERTIP TO FINGERTIP
HAVE PEOPLE ANNOYED YOU BY CRITICIZING YOUR DRINKING: NO
DO YOU DRINK ALCOHOL: YES
ORIENTATION AND CLOUDING OF SENSORIUM: ORIENTED AND CAN DO SERIAL ADDITIONS
ANXIETY: MILDLY ANXIOUS
CONSUMPTION TOTAL: POSITIVE
TOTAL SCORE: 3
HAVE YOU EVER FELT YOU SHOULD CUT DOWN ON YOUR DRINKING: YES
AGITATION: SOMEWHAT MORE THAN NORMAL ACTIVITY
TOTAL SCORE: 3

## 2023-09-08 PROBLEM — U07.1 SARS-COV-2 POSITIVE: Status: ACTIVE | Noted: 2023-09-08

## 2023-09-08 PROBLEM — E83.42 HYPOMAGNESEMIA: Status: ACTIVE | Noted: 2023-09-08

## 2023-09-08 PROBLEM — F10.231 ALCOHOL DEPENDENCE WITH WITHDRAWAL DELIRIUM (HCC): Status: ACTIVE | Noted: 2023-09-08

## 2023-09-08 PROBLEM — D69.6 THROMBOCYTOPENIA (HCC): Status: ACTIVE | Noted: 2023-09-08

## 2023-09-08 LAB
ALBUMIN SERPL BCP-MCNC: 4.4 G/DL (ref 3.2–4.9)
ALBUMIN/GLOB SERPL: 1.4 G/DL
ALP SERPL-CCNC: 86 U/L (ref 30–99)
ALT SERPL-CCNC: 83 U/L (ref 2–50)
ANION GAP SERPL CALC-SCNC: 18 MMOL/L (ref 7–16)
AST SERPL-CCNC: 108 U/L (ref 12–45)
BILIRUB SERPL-MCNC: 1 MG/DL (ref 0.1–1.5)
BUN SERPL-MCNC: 18 MG/DL (ref 8–22)
CALCIUM ALBUM COR SERPL-MCNC: 9 MG/DL (ref 8.5–10.5)
CALCIUM SERPL-MCNC: 9.3 MG/DL (ref 8.5–10.5)
CHLORIDE SERPL-SCNC: 97 MMOL/L (ref 96–112)
CO2 SERPL-SCNC: 19 MMOL/L (ref 20–33)
CREAT SERPL-MCNC: 1.01 MG/DL (ref 0.5–1.4)
EKG IMPRESSION: NORMAL
ERYTHROCYTE [DISTWIDTH] IN BLOOD BY AUTOMATED COUNT: 52.9 FL (ref 35.9–50)
ETHANOL BLD-MCNC: <10.1 MG/DL
FLUAV RNA SPEC QL NAA+PROBE: NEGATIVE
FLUBV RNA SPEC QL NAA+PROBE: NEGATIVE
GFR SERPLBLD CREATININE-BSD FMLA CKD-EPI: 86 ML/MIN/1.73 M 2
GLOBULIN SER CALC-MCNC: 3.1 G/DL (ref 1.9–3.5)
GLUCOSE SERPL-MCNC: 105 MG/DL (ref 65–99)
HCT VFR BLD AUTO: 57.8 % (ref 42–52)
HGB BLD-MCNC: 18.8 G/DL (ref 14–18)
MAGNESIUM SERPL-MCNC: 1.9 MG/DL (ref 1.5–2.5)
MCH RBC QN AUTO: 33 PG (ref 27–33)
MCHC RBC AUTO-ENTMCNC: 32.5 G/DL (ref 32.3–36.5)
MCV RBC AUTO: 101.6 FL (ref 81.4–97.8)
PHOSPHATE SERPL-MCNC: 2.6 MG/DL (ref 2.5–4.5)
PLATELET # BLD AUTO: 85 K/UL (ref 164–446)
PLATELETS.RETICULATED NFR BLD AUTO: 9.6 % (ref 0.6–13.1)
PMV BLD AUTO: 10.2 FL (ref 9–12.9)
POTASSIUM SERPL-SCNC: 3.5 MMOL/L (ref 3.6–5.5)
PROT SERPL-MCNC: 7.5 G/DL (ref 6–8.2)
RBC # BLD AUTO: 5.69 M/UL (ref 4.7–6.1)
RSV RNA SPEC QL NAA+PROBE: NEGATIVE
SARS-COV-2 RNA RESP QL NAA+PROBE: DETECTED
SODIUM SERPL-SCNC: 134 MMOL/L (ref 135–145)
SPECIMEN SOURCE: ABNORMAL
WBC # BLD AUTO: 6.4 K/UL (ref 4.8–10.8)

## 2023-09-08 PROCEDURE — 700111 HCHG RX REV CODE 636 W/ 250 OVERRIDE (IP): Performed by: STUDENT IN AN ORGANIZED HEALTH CARE EDUCATION/TRAINING PROGRAM

## 2023-09-08 PROCEDURE — 82077 ASSAY SPEC XCP UR&BREATH IA: CPT

## 2023-09-08 PROCEDURE — 51798 US URINE CAPACITY MEASURE: CPT

## 2023-09-08 PROCEDURE — 700102 HCHG RX REV CODE 250 W/ 637 OVERRIDE(OP): Performed by: STUDENT IN AN ORGANIZED HEALTH CARE EDUCATION/TRAINING PROGRAM

## 2023-09-08 PROCEDURE — 83735 ASSAY OF MAGNESIUM: CPT

## 2023-09-08 PROCEDURE — 84100 ASSAY OF PHOSPHORUS: CPT

## 2023-09-08 PROCEDURE — 700105 HCHG RX REV CODE 258: Performed by: STUDENT IN AN ORGANIZED HEALTH CARE EDUCATION/TRAINING PROGRAM

## 2023-09-08 PROCEDURE — C9803 HOPD COVID-19 SPEC COLLECT: HCPCS | Performed by: INTERNAL MEDICINE

## 2023-09-08 PROCEDURE — 80053 COMPREHEN METABOLIC PANEL: CPT

## 2023-09-08 PROCEDURE — 0241U HCHG SARS-COV-2 COVID-19 NFCT DS RESP RNA 4 TRGT MIC: CPT

## 2023-09-08 PROCEDURE — 99292 CRITICAL CARE ADDL 30 MIN: CPT | Performed by: INTERNAL MEDICINE

## 2023-09-08 PROCEDURE — 99291 CRITICAL CARE FIRST HOUR: CPT | Performed by: INTERNAL MEDICINE

## 2023-09-08 PROCEDURE — 700105 HCHG RX REV CODE 258: Performed by: INTERNAL MEDICINE

## 2023-09-08 PROCEDURE — 96376 TX/PRO/DX INJ SAME DRUG ADON: CPT

## 2023-09-08 PROCEDURE — A9270 NON-COVERED ITEM OR SERVICE: HCPCS | Performed by: STUDENT IN AN ORGANIZED HEALTH CARE EDUCATION/TRAINING PROGRAM

## 2023-09-08 PROCEDURE — 770022 HCHG ROOM/CARE - ICU (200)

## 2023-09-08 PROCEDURE — 96365 THER/PROPH/DIAG IV INF INIT: CPT

## 2023-09-08 PROCEDURE — 36415 COLL VENOUS BLD VENIPUNCTURE: CPT

## 2023-09-08 PROCEDURE — 700101 HCHG RX REV CODE 250: Performed by: STUDENT IN AN ORGANIZED HEALTH CARE EDUCATION/TRAINING PROGRAM

## 2023-09-08 PROCEDURE — 700111 HCHG RX REV CODE 636 W/ 250 OVERRIDE (IP): Performed by: INTERNAL MEDICINE

## 2023-09-08 PROCEDURE — 96366 THER/PROPH/DIAG IV INF ADDON: CPT

## 2023-09-08 PROCEDURE — 96375 TX/PRO/DX INJ NEW DRUG ADDON: CPT

## 2023-09-08 PROCEDURE — 85055 RETICULATED PLATELET ASSAY: CPT

## 2023-09-08 PROCEDURE — 96367 TX/PROPH/DG ADDL SEQ IV INF: CPT

## 2023-09-08 PROCEDURE — 85027 COMPLETE CBC AUTOMATED: CPT

## 2023-09-08 RX ORDER — MAGNESIUM SULFATE HEPTAHYDRATE 40 MG/ML
2 INJECTION, SOLUTION INTRAVENOUS ONCE
Status: COMPLETED | OUTPATIENT
Start: 2023-09-08 | End: 2023-09-08

## 2023-09-08 RX ORDER — DEXAMETHASONE SODIUM PHOSPHATE 4 MG/ML
6 INJECTION, SOLUTION INTRA-ARTICULAR; INTRALESIONAL; INTRAMUSCULAR; INTRAVENOUS; SOFT TISSUE DAILY
Status: DISCONTINUED | OUTPATIENT
Start: 2023-09-08 | End: 2023-09-09

## 2023-09-08 RX ORDER — PHENOBARBITAL SODIUM 130 MG/ML
130 INJECTION INTRAMUSCULAR; INTRAVENOUS
Status: DISCONTINUED | OUTPATIENT
Start: 2023-09-08 | End: 2023-09-09

## 2023-09-08 RX ORDER — GAUZE BANDAGE 2" X 2"
100 BANDAGE TOPICAL DAILY
Status: DISCONTINUED | OUTPATIENT
Start: 2023-09-11 | End: 2023-09-12 | Stop reason: HOSPADM

## 2023-09-08 RX ORDER — CHLORDIAZEPOXIDE HYDROCHLORIDE 25 MG/1
25 CAPSULE, GELATIN COATED ORAL EVERY 6 HOURS
Status: DISCONTINUED | OUTPATIENT
Start: 2023-09-09 | End: 2023-09-08

## 2023-09-08 RX ORDER — PHENOBARBITAL SODIUM 130 MG/ML
260 INJECTION INTRAMUSCULAR; INTRAVENOUS
Status: DISCONTINUED | OUTPATIENT
Start: 2023-09-08 | End: 2023-09-09

## 2023-09-08 RX ORDER — POTASSIUM CHLORIDE 7.45 MG/ML
10 INJECTION INTRAVENOUS
Status: COMPLETED | OUTPATIENT
Start: 2023-09-08 | End: 2023-09-08

## 2023-09-08 RX ORDER — SODIUM CHLORIDE, SODIUM LACTATE, POTASSIUM CHLORIDE, CALCIUM CHLORIDE 600; 310; 30; 20 MG/100ML; MG/100ML; MG/100ML; MG/100ML
INJECTION, SOLUTION INTRAVENOUS CONTINUOUS
Status: DISCONTINUED | OUTPATIENT
Start: 2023-09-08 | End: 2023-09-09

## 2023-09-08 RX ORDER — CHLORDIAZEPOXIDE HYDROCHLORIDE 25 MG/1
50 CAPSULE, GELATIN COATED ORAL EVERY 6 HOURS
Status: DISCONTINUED | OUTPATIENT
Start: 2023-09-08 | End: 2023-09-08

## 2023-09-08 RX ADMIN — SODIUM CHLORIDE: 9 INJECTION, SOLUTION INTRAVENOUS at 01:28

## 2023-09-08 RX ADMIN — POLYMYXIN B SULFATE AND TRIMETHOPRIM 1 DROP: 10000; 1 SOLUTION OPHTHALMIC at 05:27

## 2023-09-08 RX ADMIN — SODIUM CHLORIDE, POTASSIUM CHLORIDE, SODIUM LACTATE AND CALCIUM CHLORIDE: 600; 310; 30; 20 INJECTION, SOLUTION INTRAVENOUS at 22:25

## 2023-09-08 RX ADMIN — LORAZEPAM 1.5 MG: 2 INJECTION INTRAMUSCULAR; INTRAVENOUS at 06:55

## 2023-09-08 RX ADMIN — POLYMYXIN B SULFATE AND TRIMETHOPRIM 1 DROP: 10000; 1 SOLUTION OPHTHALMIC at 12:41

## 2023-09-08 RX ADMIN — POTASSIUM CHLORIDE 10 MEQ: 7.46 INJECTION, SOLUTION INTRAVENOUS at 13:47

## 2023-09-08 RX ADMIN — POTASSIUM CHLORIDE 10 MEQ: 7.46 INJECTION, SOLUTION INTRAVENOUS at 12:40

## 2023-09-08 RX ADMIN — THIAMINE HYDROCHLORIDE 100 MG: 100 INJECTION, SOLUTION INTRAMUSCULAR; INTRAVENOUS at 05:43

## 2023-09-08 RX ADMIN — SENNOSIDES AND DOCUSATE SODIUM 2 TABLET: 50; 8.6 TABLET ORAL at 05:21

## 2023-09-08 RX ADMIN — POLYMYXIN B SULFATE AND TRIMETHOPRIM 1 DROP: 10000; 1 SOLUTION OPHTHALMIC at 22:26

## 2023-09-08 RX ADMIN — THERA TABS 1 TABLET: TAB at 05:21

## 2023-09-08 RX ADMIN — POTASSIUM CHLORIDE 10 MEQ: 7.46 INJECTION, SOLUTION INTRAVENOUS at 04:23

## 2023-09-08 RX ADMIN — FOLIC ACID 1 MG: 1 TABLET ORAL at 05:21

## 2023-09-08 RX ADMIN — ERYTHROMYCIN 1 APPLICATION: 5 OINTMENT OPHTHALMIC at 03:15

## 2023-09-08 RX ADMIN — ERYTHROMYCIN 1 APPLICATION: 5 OINTMENT OPHTHALMIC at 05:47

## 2023-09-08 RX ADMIN — DEXAMETHASONE SODIUM PHOSPHATE 6 MG: 4 INJECTION INTRA-ARTICULAR; INTRALESIONAL; INTRAMUSCULAR; INTRAVENOUS; SOFT TISSUE at 10:12

## 2023-09-08 RX ADMIN — DIAZEPAM 5 MG: 5 INJECTION, SOLUTION INTRAMUSCULAR; INTRAVENOUS at 00:28

## 2023-09-08 RX ADMIN — MAGNESIUM SULFATE HEPTAHYDRATE 2 G: 2 INJECTION, SOLUTION INTRAVENOUS at 11:11

## 2023-09-08 RX ADMIN — LORAZEPAM 1.5 MG: 2 INJECTION INTRAMUSCULAR; INTRAVENOUS at 05:40

## 2023-09-08 RX ADMIN — POTASSIUM CHLORIDE 10 MEQ: 7.46 INJECTION, SOLUTION INTRAVENOUS at 01:35

## 2023-09-08 RX ADMIN — POTASSIUM CHLORIDE 10 MEQ: 7.46 INJECTION, SOLUTION INTRAVENOUS at 02:47

## 2023-09-08 RX ADMIN — POLYMYXIN B SULFATE AND TRIMETHOPRIM 1 DROP: 10000; 1 SOLUTION OPHTHALMIC at 14:09

## 2023-09-08 RX ADMIN — THIAMINE HYDROCHLORIDE 500 MG: 100 INJECTION, SOLUTION INTRAMUSCULAR; INTRAVENOUS at 10:18

## 2023-09-08 RX ADMIN — LORAZEPAM 1 MG: 2 INJECTION INTRAMUSCULAR; INTRAVENOUS at 04:13

## 2023-09-08 RX ADMIN — ERYTHROMYCIN 1 APPLICATION: 5 OINTMENT OPHTHALMIC at 17:21

## 2023-09-08 RX ADMIN — POLYMYXIN B SULFATE AND TRIMETHOPRIM 1 DROP: 10000; 1 SOLUTION OPHTHALMIC at 01:38

## 2023-09-08 RX ADMIN — LORAZEPAM 2 MG: 2 INJECTION INTRAMUSCULAR; INTRAVENOUS at 07:44

## 2023-09-08 RX ADMIN — POLYMYXIN B SULFATE AND TRIMETHOPRIM 1 DROP: 10000; 1 SOLUTION OPHTHALMIC at 17:21

## 2023-09-08 RX ADMIN — SODIUM CHLORIDE, POTASSIUM CHLORIDE, SODIUM LACTATE AND CALCIUM CHLORIDE: 600; 310; 30; 20 INJECTION, SOLUTION INTRAVENOUS at 10:10

## 2023-09-08 RX ADMIN — POTASSIUM CHLORIDE 10 MEQ: 7.46 INJECTION, SOLUTION INTRAVENOUS at 11:26

## 2023-09-08 RX ADMIN — POTASSIUM CHLORIDE 10 MEQ: 7.46 INJECTION, SOLUTION INTRAVENOUS at 10:12

## 2023-09-08 RX ADMIN — PHENOBARBITAL SODIUM 520 MG: 130 INJECTION INTRAMUSCULAR; INTRAVENOUS at 00:51

## 2023-09-08 RX ADMIN — ERYTHROMYCIN 1 APPLICATION: 5 OINTMENT OPHTHALMIC at 12:41

## 2023-09-08 ASSESSMENT — ENCOUNTER SYMPTOMS
BACK PAIN: 0
CHILLS: 0
NAUSEA: 1
ORTHOPNEA: 0
NERVOUS/ANXIOUS: 1
ABDOMINAL PAIN: 0
DIARRHEA: 0
VOMITING: 0
DIZZINESS: 0
FEVER: 0
BLURRED VISION: 1
HEADACHES: 0
INSOMNIA: 1
HALLUCINATIONS: 1
PALPITATIONS: 0
NECK PAIN: 0
TREMORS: 1

## 2023-09-08 ASSESSMENT — LIFESTYLE VARIABLES
TOTAL SCORE: 25
ANXIETY: *
ORIENTATION AND CLOUDING OF SENSORIUM: DATE DISORIENTATION BY MORE THAN TWO CALENDAR DAYS
HEADACHE, FULLNESS IN HEAD: VERY MILD
HEADACHE, FULLNESS IN HEAD: VERY MILD
AGITATION: *
NAUSEA AND VOMITING: NO NAUSEA AND NO VOMITING
VISUAL DISTURBANCES: CONTINUOUS HALLUCINATIONS
VISUAL DISTURBANCES: MODERATE SENSITIVITY
AUDITORY DISTURBANCES: MILD HARSHNESS OR ABILITY TO FRIGHTEN
TACTILE DISTURBANCES: SEVERE HALLUCINATIONS
TREMOR: *
TREMOR: *
TREMOR: TREMOR NOT VISIBLE BUT CAN BE FELT, FINGERTIP TO FINGERTIP
HEADACHE, FULLNESS IN HEAD: VERY MILD
TREMOR: MODERATE TREMOR WITH ARMS EXTENDED
TOTAL SCORE: 14
NAUSEA AND VOMITING: NO NAUSEA AND NO VOMITING
ORIENTATION AND CLOUDING OF SENSORIUM: DATE DISORIENTATION BY MORE THAN TWO CALENDAR DAYS
AGITATION: SOMEWHAT MORE THAN NORMAL ACTIVITY
TOTAL SCORE: 19
ANXIETY: NO ANXIETY (AT EASE)
PAROXYSMAL SWEATS: *
ORIENTATION AND CLOUDING OF SENSORIUM: ORIENTED AND CAN DO SERIAL ADDITIONS
PAROXYSMAL SWEATS: NO SWEAT VISIBLE
AGITATION: MODERATELY FIDGETY AND RESTLESS
AUDITORY DISTURBANCES: NOT PRESENT
HEADACHE, FULLNESS IN HEAD: VERY MILD
TOTAL SCORE: 19
TREMOR: *
ANXIETY: MODERATELY ANXIOUS OR GUARDED, SO ANXIETY IS INFERRED
PAROXYSMAL SWEATS: *
TREMOR: *
TOTAL SCORE: 2
ORIENTATION AND CLOUDING OF SENSORIUM: ORIENTED AND CAN DO SERIAL ADDITIONS
AUDITORY DISTURBANCES: NOT PRESENT
PAROXYSMAL SWEATS: BARELY PERCEPTIBLE SWEATING. PALMS MOIST
ANXIETY: *
AUDITORY DISTURBANCES: NOT PRESENT
ORIENTATION AND CLOUDING OF SENSORIUM: ORIENTED AND CAN DO SERIAL ADDITIONS
VISUAL DISTURBANCES: CONTINUOUS HALLUCINATIONS
ORIENTATION AND CLOUDING OF SENSORIUM: ORIENTED AND CAN DO SERIAL ADDITIONS
NAUSEA AND VOMITING: NO NAUSEA AND NO VOMITING
SUBSTANCE_ABUSE: 1
NAUSEA AND VOMITING: NO NAUSEA AND NO VOMITING
NAUSEA AND VOMITING: NO NAUSEA AND NO VOMITING
ANXIETY: MILDLY ANXIOUS
HEADACHE, FULLNESS IN HEAD: VERY MILD
TOTAL SCORE: MILD ITCHING, PINS AND NEEDLES SENSATION, BURNING OR NUMBNESS
PAROXYSMAL SWEATS: BEADS OF SWEAT OBVIOUS ON FOREHEAD
AGITATION: MODERATELY FIDGETY AND RESTLESS
AGITATION: NORMAL ACTIVITY
ANXIETY: MODERATELY ANXIOUS OR GUARDED, SO ANXIETY IS INFERRED
NAUSEA AND VOMITING: NO NAUSEA AND NO VOMITING
AUDITORY DISTURBANCES: MODERATE HARSHNESS OR ABILITY TO FRIGHTEN
VISUAL DISTURBANCES: MODERATE SENSITIVITY
VISUAL DISTURBANCES: CONTINUOUS HALLUCINATIONS
TOTAL SCORE: 27
VISUAL DISTURBANCES: VERY MILD SENSITIVITY
AUDITORY DISTURBANCES: NOT PRESENT
AGITATION: SOMEWHAT MORE THAN NORMAL ACTIVITY
HEADACHE, FULLNESS IN HEAD: NOT PRESENT
PAROXYSMAL SWEATS: BEADS OF SWEAT OBVIOUS ON FOREHEAD

## 2023-09-08 ASSESSMENT — COPD QUESTIONNAIRES
DO YOU EVER COUGH UP ANY MUCUS OR PHLEGM?: NO/ONLY WITH OCCASIONAL COLDS OR INFECTIONS
DURING THE PAST 4 WEEKS HOW MUCH DID YOU FEEL SHORT OF BREATH: NONE/LITTLE OF THE TIME
COPD SCREENING SCORE: 2
HAVE YOU SMOKED AT LEAST 100 CIGARETTES IN YOUR ENTIRE LIFE: NO/DON'T KNOW

## 2023-09-08 ASSESSMENT — FIBROSIS 4 INDEX: FIB4 SCORE: 8.23

## 2023-09-08 NOTE — CONSULTS
PULMONARY AND CRITICAL CARE MEDICINE CONSULTATION    Date of Consultation:  9/8/2023    Requesting Physician:  Leah Suarez DO    Consulting Physician:  Maurilio Davis MD    Reason for Consultation: Critical care management in gentleman with severe alcohol withdrawal delirium    Chief Complaint: Severe alcohol withdrawal delirium    History of Present Illness:    I was kindly asked to see and evaluate Corona Drummond, a 58 y.o. male for evaluation and management of the above problem.    The entire history is obtained from healthcare providers and the medical record as his gentleman cannot provide me with any history.  This gentleman has a history of alcohol dependence with previous withdrawal.  He apparently has been sick recently and tested positive for SARS-CoV-2 on a home test.  He was brought into the ED last evening with agitation, paranoia and visual hallucinations.  It is unclear when he last stopped drinking as he was surprisingly not forthcoming regarding his alcohol use.  He apparently was outside of his house with a crowbar stating that someone was trying to break into his house.  Since arrival in the ED he has been treated with 6 mg of lorazepam, 10 mg of diazepam and 780 mg of phenobarbital.  Despite these interventions, he remains quite agitated.    Medications Prior to Admission:    No current facility-administered medications on file prior to encounter.     Current Outpatient Medications on File Prior to Encounter   Medication Sig Dispense Refill    therapeutic multivitamin-minerals (THERAGRAN-M) Tab Take 1 Tablet by mouth every day.      Cholecalciferol (VITAMIN D3) 03382 UNIT Cap Take 10,000 Units by mouth every day.      thiamine (VITAMIN B-1) 100 MG Tab Take 100 mg by mouth every day.      BIOTIN PO Take 1 Tablet by mouth every day.         Current Medications:      Current Facility-Administered Medications:     thiamine (B-1) 500 mg in dextrose 5% 100 mL IVPB, 500 mg,  Intravenous, DAILY **FOLLOWED BY** [START ON 9/11/2023] thiamine (Vitamin B-1) tablet 100 mg, 100 mg, Oral, DAILY, Maurilio Davis M.D.    Pharmacy Consult Request - Benzodiazepine review, , Other, PHARMACY TO DOSE, Maurilio Davis M.D.    PHENObarbital injection 130 mg, 130 mg, Intravenous, Q30 MIN PRN **AND** PHENObarbital injection 260 mg, 260 mg, Intravenous, Q30 MIN PRN, Maurilio Davis M.D.    MD Alert...ICU Electrolyte Replacement per Pharmacy, , Other, PHARMACY TO DOSE, Maurilio Davis M.D.    lactated ringers infusion, , Intravenous, Continuous, Maurilio Davis M.D.    magnesium sulfate IVPB premix 2 g, 2 g, Intravenous, Once, Maurilio Davis M.D.    potassium chloride (Kcl) ivpb 10 mEq, 10 mEq, Intravenous, Q HOUR, Maurilio Davis M.D.    dexamethasone (Decadron) injection 6 mg, 6 mg, Intravenous, DAILY, Maurilio Davis M.D.    erythromycin ophthalmic ointment 1 Application, 1 Application, Both Eyes, Q6HRS, Francisco Javier Frank D.O., 1 Application at 09/08/23 0547    senna-docusate (Pericolace Or Senokot S) 8.6-50 MG per tablet 2 Tablet, 2 Tablet, Oral, BID, 2 Tablet at 09/08/23 0521 **AND** polyethylene glycol/lytes (Miralax) PACKET 1 Packet, 1 Packet, Oral, QDAY PRN **AND** magnesium hydroxide (Milk Of Magnesia) suspension 30 mL, 30 mL, Oral, QDAY PRN **AND** bisacodyl (Dulcolax) suppository 10 mg, 10 mg, Rectal, QDAY PRN, Haseeb Melchor M.D.    NS infusion, , Intravenous, Continuous, Haseeb Melchor M.D., Last Rate: 150 mL/hr at 09/08/23 0128, New Bag at 09/08/23 0128    enoxaparin (Lovenox) inj 40 mg, 40 mg, Subcutaneous, DAILY AT 1800, Haseeb Melchor M.D.    acetaminophen (Tylenol) tablet 650 mg, 650 mg, Oral, Q6HRS PRN, Haseeb Melchor M.D.    ondansetron (Zofran) syringe/vial injection 4 mg, 4 mg, Intravenous, Q4HRS PRN, Haseeb Melchor M.D.    ondansetron (Zofran ODT) dispertab 4 mg, 4 mg, Oral, Q4HRS PRN, Haseeb Melchor M.D.    promethazine  (Phenergan) tablet 12.5-25 mg, 12.5-25 mg, Oral, Q4HRS PRN, Haseeb Melchor M.D.    promethazine (Phenergan) suppository 12.5-25 mg, 12.5-25 mg, Rectal, Q4HRS PRN, Haseeb Melchor M.D.    prochlorperazine (Compazine) injection 5-10 mg, 5-10 mg, Intravenous, Q4HRS PRN, Haseeb Meclhor M.D.    folic acid (Folvite) tablet 1 mg, 1 mg, Oral, DAILY, Haseeb Melchor M.D., 1 mg at 09/08/23 0521    multivitamin tablet 1 Tablet, 1 Tablet, Oral, DAILY, Haseeb Melchor M.D., 1 Tablet at 09/08/23 0521    polymixin-trimethoprim (Polytrim) 12515-9.1 UNIT/ML-% ophthalmic solution 1 Drop, 1 Drop, Both Eyes, Q4HRS, Haseeb Melchor M.D., 1 Drop at 09/08/23 0527    Current Outpatient Medications:     therapeutic multivitamin-minerals (THERAGRAN-M) Tab, Take 1 Tablet by mouth every day., Disp: , Rfl:     Cholecalciferol (VITAMIN D3) 19983 UNIT Cap, Take 10,000 Units by mouth every day., Disp: , Rfl:     thiamine (VITAMIN B-1) 100 MG Tab, Take 100 mg by mouth every day., Disp: , Rfl:     BIOTIN PO, Take 1 Tablet by mouth every day., Disp: , Rfl:     Allergies:    Patient has no known allergies.    Past Surgical History:    History reviewed. No pertinent surgical history.    Past Medical History:    History reviewed. No pertinent past medical history.    Social History:    Social History     Socioeconomic History    Marital status: Single     Spouse name: Not on file    Number of children: Not on file    Years of education: Not on file    Highest education level: Not on file   Occupational History    Not on file   Tobacco Use    Smoking status: Never    Smokeless tobacco: Never   Substance and Sexual Activity    Alcohol use: No    Drug use: No    Sexual activity: Not on file   Other Topics Concern    Not on file   Social History Narrative    Not on file     Social Determinants of Health     Financial Resource Strain: Not on file   Food Insecurity: Not on file   Transportation Needs: Not on file   Physical Activity: Not on file   Stress:  "Not on file   Social Connections: Not on file   Intimate Partner Violence: Not on file   Housing Stability: Not on file       Family History:    No family history on file.    Review of System:    Review of Systems   Unable to perform ROS: Acuity of condition       Physical Examination:    BP (!) 140/99   Pulse (!) 110   Temp 36.7 °C (98 °F) (Temporal)   Resp (!) 28   Ht 1.803 m (5' 11\")   Wt (!) 127 kg (280 lb)   SpO2 95%   BMI 39.05 kg/m²   Physical Exam  Constitutional:       Appearance: He is obese. He is diaphoretic.   HENT:      Head: Normocephalic.      Mouth/Throat:      Pharynx: Oropharynx is clear.   Eyes:      Comments: Conjunctive are red   Cardiovascular:      Comments: Sinus tachycardia  Pulmonary:      Breath sounds: No wheezing or rales.   Abdominal:      General: There is no distension.      Tenderness: There is no abdominal tenderness.   Musculoskeletal:      Right lower leg: No edema.      Left lower leg: No edema.   Skin:     General: Skin is warm.      Capillary Refill: Capillary refill takes less than 2 seconds.   Neurological:      Comments: Currently sedated for agitation         Laboratory Data:        Recent Labs     09/07/23 2050 09/08/23  0446   WBC 8.0 6.4   RBC 5.85 5.69   HEMOGLOBIN 19.7* 18.8*   HEMATOCRIT 57.9* 57.8*   MCV 99.0* 101.6*   MCH 33.7* 33.0   MCHC 34.0 32.5   RDW 51.5* 52.9*   PLATELETCT 90* 85*   MPV 10.8 10.2     Recent Labs     09/07/23 2050 09/08/23  0643   SODIUM 133* 134*   POTASSIUM 3.3* 3.5*   CHLORIDE 92* 97   CO2 20 19*   GLUCOSE 99 105*   BUN 22 18   CREATININE 1.33 1.01   CALCIUM 10.2 9.3                   Imaging:    I personally viewed all the available CXR and CT scan images as well as reviewed the radiology interpretation reports.    DX-CHEST-PORTABLE (1 VIEW)   Final Result         1.  No acute cardiopulmonary disease.          Assessment and Plan:    * Alcohol dependence with withdrawal delirium (HCC)- (present on admission)  Assessment & " Plan  Severe alcohol withdrawal delirium  I am administering intravenous phenobarbital based upon serial RASS assessments  Cessation counseling when clinically appropriate  High-dose IV thiamine and supplemental vitamins    SARS-CoV-2 positive  Assessment & Plan  Reportedly tested positive for SARS-CoV-2 on a home test  CXR clear  Test for influenza, RSV and SARS-CoV-2  Begin dexamethasone, 6 mg daily for 10-day course    Alcoholic hepatitis  Assessment & Plan  Follow liver enzymes and synthetic function  Avoid hepatotoxins    Hypomagnesemia  Assessment & Plan  Replete magnesium    Thrombocytopenia (HCC)  Assessment & Plan  Likely due to bone marrow suppression from alcohol dependence  Trend platelet count    Conjunctivitis of both eyes  Assessment & Plan  Erythromycin and polymyxin/trimethoprim eyedrops    Hypokalemia  Assessment & Plan  Replete potassium      I have assessed and reassessed his respiratory status, blood pressure, hemodynamics, cardiovascular status and neurologic status with the administration of intravenous phenobarbital for severe alcohol withdrawal delirium.  He is at increased risk for worsening respiratory, cardiovascular and CNS system dysfunction.    High risk of deterioration and worsening vital organ dysfunction and death without the above critical care interventions.    Thank you for allowing me to participate in the care of this gentleman.  I will continue to follow him with great interest.    The patient is critically ill.  Critical care time = 105 minutes in directly providing and coordinating critical care and extensive data review.  No time overlap and excludes procedures.    Discussed with hospitalist, RN    Maurilio Davis MD  Pulmonary and Critical Care Medicine

## 2023-09-08 NOTE — ED NOTES
Checked on bed, connected to monitor, awake with unlabored respirations. Vital signs is stable.   Denied any new complaints. No current needs identified.  Gurney in low position, side rail up for pt safety. Call light within reach.

## 2023-09-08 NOTE — ED NOTES
Patient remains resting in bed, but continues to have visual hallucinations. Patient remains calm and cooperative. He is aware why he is here and appears Aox4. Stable on RA.

## 2023-09-08 NOTE — ED NOTES
Patient remains resting in bed with frequent redirection. Patient having constant visual hallucination and has tried to get out of bed several times. Even though the patient is aware why he is here when asked he forgets quickly that he is hooked up to monitors and IV medication. Patient placed on bed alarm. Patient remains stable on RA

## 2023-09-08 NOTE — PROGRESS NOTES
On 09/08 one [facility] and [facility] #[id number]

## 2023-09-08 NOTE — ED NOTES
Checked on bed, connected to monitor,awake  with unlabored respirations. Vital signs is stable.   Denied any new complaints. No current needs identified.  Gurney in low position, side rail up for pt safety. Call light within reach.

## 2023-09-08 NOTE — ED PROVIDER NOTES
"ED Provider Note    CHIEF COMPLAINT  Chief Complaint   Patient presents with    Hallucinations     BIB EMS from home; family states patient has an episode of visual hallucinations this morning; talking and arguing with nobody in front of his car. Currently patient is AOX4 GCS15, denies HI/SI and any hallucinations at this time; Patient is currently on his 10th day of ETOH detox;  tested positive for Covid this morning on home test and currently  with \"pink eye'.       EXTERNAL RECORDS REVIEWED  EMS run sheet      HPI/ROS  LIMITATION TO HISTORY   Select: : None  OUTSIDE HISTORIAN(S):  Parent Father Vitaliy Drummond is a 58 y.o. male with a past medical history of alcohol use disorder presenting to the emergency department for visual hallucinations.  According to patient, he has been detoxing from alcohol for approximately 10 days.    Spoke with father Vitaliy, ([phone]) says that he has had COVID symptoms, was found today in the front yard with a crowbar, saying somebody was trying to break into the house, very paranoid. Has had similar hallucinations during an episode of withdrawal prior.  Says that he was admitted to [facility] in the spring under similar circumstances.  Patient says that he has been trying to cut back on alcohol As his mother is in the hospital being treated for multiple myeloma and a recent stroke.  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    No prior history of primary psychiatric disorder.  Never been diagnosed with schizophrenia or bipolar disorder.    Patient says that his last drink was approximately 10 days ago though patient's father does not think this is accurate.  Patient is somewhat secretive about his alcohol use.  Drinks primarily beer.        PAST MEDICAL HISTORY       SURGICAL HISTORY  patient denies any surgical history    FAMILY HISTORY  No family history on file.    SOCIAL HISTORY  Social History     Tobacco Use    Smoking status: Never    " "Smokeless tobacco: Never   Substance and Sexual Activity    Alcohol use: No    Drug use: No    Sexual activity: Not on file       CURRENT MEDICATIONS  Home Medications       Reviewed by Leila Frey (Pharmacy Tech) on 23 at 2208  Med List Status: Complete     Medication Last Dose Status   BIOTIN PO 2023 Active   Cholecalciferol (VITAMIN D3) 08199 UNIT Cap 2023 Active   therapeutic multivitamin-minerals (THERAGRAN-M) Tab 2023 Active   thiamine (VITAMIN B-1) 100 MG Tab 2023 Active                    ALLERGIES  No Known Allergies    PHYSICAL EXAM  VITAL SIGNS: BP (!) 146/91   Pulse (!) 117   Temp 36.7 °C (98 °F) (Temporal)   Resp 18   Ht 1.803 m (5' 11\")   Wt (!) 127 kg (280 lb)   BMI 39.05 kg/m²    General: Diaphoretic, slightly tremulous  Neuro: oriented x 3, moving all extremities.   HEENT:   - Head: Normocephalic, atraumatic  - Eyes: PERRL  - Ears/Nose: normal external nose and ears  - Mouth: Mild tongue fasciculations  Resp: clear to auscultation, no increased work of breathing  CV: Tachycardic, regular rhythm  Abd: Soft, non-tender, non-distended  Extremities: No peripheral edema  Psych: lucid and conversational, pleasant and cooperative, not endorsing any hallucinations though is perez about hallucinations that he was experiencing earlier today.  Not endorsing suicidal or homicidal ideations.    DIAGNOSTIC STUDIES / PROCEDURES  EKG  I have independently interpreted this EKG  Results for orders placed or performed during the hospital encounter of 23   EKG (NOW)   Result Value Ref Range    Report       [facility] Emergency Dept.    Test Date:  2023  Pt Name:    APRIL SHARP                Department: ER  MRN:        [id number]                      Room:       [facility]  Gender:     Male                         Technician: [id number]  :        1965                   Requested By:DIGNA DEAN  Order #:    [id number]                    Kamron RUVALCABA: " Francisco Javier Frank    Measurements  Intervals                                Axis  Rate:       114                          P:          58  AK:         185                          QRS:        -11  QRSD:       99                           T:          62  QT:         331  QTc:        456    Interpretive Statements  Sinus tachycardia  LAE  LAD  No ST or T changes  Electronically Signed On 09- 04:52:53 PDT by Francisco Javier Frank           LABS  Labs Reviewed   CBC WITH DIFFERENTIAL - Abnormal; Notable for the following components:       Result Value    Hemoglobin 19.7 (*)     Hematocrit 57.9 (*)     MCV 99.0 (*)     MCH 33.7 (*)     RDW 51.5 (*)     Platelet Count 90 (*)     Neutrophils-Polys 75.50 (*)     Lymphocytes 6.60 (*)     Monocytes 16.60 (*)     Lymphs (Absolute) 0.53 (*)     Monos (Absolute) 1.32 (*)     All other components within normal limits    Narrative:     Indicate which anticoagulants the patient is on:->UNKNOWN   COMP METABOLIC PANEL - Abnormal; Notable for the following components:    Sodium 133 (*)     Potassium 3.3 (*)     Chloride 92 (*)     Anion Gap 21.0 (*)     AST(SGOT) 142 (*)     ALT(SGPT) 97 (*)     Alkaline Phosphatase 100 (*)     Total Protein 8.6 (*)     Globulin 3.8 (*)     All other components within normal limits    Narrative:     Indicate which anticoagulants the patient is on:->UNKNOWN   TROPONIN - Abnormal; Notable for the following components:    Troponin T 23 (*)     All other components within normal limits    Narrative:     Indicate which anticoagulants the patient is on:->UNKNOWN   LIPASE    Narrative:     Indicate which anticoagulants the patient is on:->UNKNOWN   PROTHROMBIN TIME    Narrative:     Indicate which anticoagulants the patient is on:->UNKNOWN   AMMONIA   APTT    Narrative:     Indicate which anticoagulants the patient is on:->UNKNOWN   MAGNESIUM    Narrative:     Indicate which anticoagulants the patient is on:->UNKNOWN   PHOSPHORUS    Narrative:     Indicate which  anticoagulants the patient is on:->UNKNOWN   ESTIMATED GFR    Narrative:     Indicate which anticoagulants the patient is on:->UNKNOWN   IMMATURE PLT FRACTION    Narrative:     Indicate which anticoagulants the patient is on:->UNKNOWN   URINALYSIS   DIAGNOSTIC ALCOHOL   CBC WITHOUT DIFFERENTIAL   COMP METABOLIC PANEL   MAGNESIUM   PHOSPHORUS   URINE DRUG SCREEN     CBC with polycythemia, hemoglobin 9.19.7 likely consistent with hemoconcentration.  Platelet count 90 consistent with history of alcohol use disorder, cirrhosis.  Chemistry with mild hyponatremia, sodium 133, mild hypokalemia potassium 3.3.  ALT, AST, alk phos elevated.    RADIOLOGY  I have independently interpreted the diagnostic imaging associated with this visit and am waiting the final reading from the radiologist.   My preliminary interpretation is as follows: Unremarkable chest x-ray  Radiologist interpretation:   DX-CHEST-PORTABLE (1 VIEW)   Final Result         1.  No acute cardiopulmonary disease.            COURSE & MEDICAL DECISION MAKING    ED Observation Status? No; Patient does not meet criteria for ED Observation.     INITIAL ASSESSMENT, COURSE AND PLAN  Care Narrative: This is a 58-year-old male presenting to the emergency department for hallucinations, paranoid thoughts, alcohol withdrawal.  On arrival to the emergency department, patient is moderately tachycardic.  He is completely lucid, answering questions appropriately but does endorse some paranoid thoughts and hallucinations earlier today.  He is experienced this with prior episodes of alcohol withdrawal in the past.  I treated his withdrawal with phenobarbital and Valium.  I administered gentle fluids, thiamine, folic acid, magnesium supplementation.  Patient continues to be moderately tachycardic and symptomatic so will need to be admitted to the hospital for acute alcohol withdrawal.    Of note, patient has had symptomatic upper respiratory infection for the last 10 days.  Today  he tested positive for COVID on a home test.  He has bilateral conjunctivitis, most likely viral but will prophylax with topical erythromycin ointment.    On reevaluation, withdrawal has stabilized and is appropriate for telemetry.  Discussed with hospitalist for admission.      CRITICAL CARE  The very real possibilty of a deterioration of this patient's condition required the highest level of my preparedness for sudden, emergent intervention.  I provided critical care services, which included medication orders, frequent reevaluations of the patient's condition and response to treatment, ordering and reviewing test results, and discussing the case with various consultants.  The critical care time associated with the care of the patient was 35 minutes. Review chart for interventions. This time is exclusive of any other billable procedures.         ADDITIONAL PROBLEM LIST  Alcohol use disorder  DISPOSITION AND DISCUSSIONS  I have discussed management of the patient with the following physicians and CESIA's:      Discussion of management with other QHP or appropriate source(s):      Escalation of care considered, and ultimately not performed:    Barriers to care at this time, including but not limited to: Alcohol use disorder, limited financial capacity.     Decision tools and prescription drugs considered including, but not limited to: .    FINAL DIAGNOSIS  1. Delirium tremens (HCC)    2. Visual hallucinations    3. Hypokalemia    4. Acute conjunctivitis of both eyes, unspecified acute conjunctivitis type    5. COVID-19           Electronically signed by: Francisco Javier Frank D.O., 9/7/2023 8:08 PM

## 2023-09-08 NOTE — ASSESSMENT & PLAN NOTE
Patient has been doing detox at home.  He was sent to the emergency department by his father for worsening hallucinations, and tremors.  Continue [facility] protocol

## 2023-09-08 NOTE — ED NOTES
Med Rec complete per patient  Allergies reviewed  Preferred Pharmacy: [facility] on [address]  Patient denies taking any prescription medications in the last 30 days

## 2023-09-08 NOTE — ED NOTES
Bedside report received from ISAEL Benitez. Patient remains resting in room. Respiration even and unlabored. Medically necessary equipment in room. Patient on RA. Standard fall precaution in place.

## 2023-09-08 NOTE — ASSESSMENT & PLAN NOTE
Severe alcohol withdrawal delirium  I am administering intravenous phenobarbital based upon serial RASS assessments  Cessation counseling when clinically appropriate  High-dose IV thiamine and supplemental vitamins

## 2023-09-08 NOTE — ED NOTES
Received bedside report from Dc RN  Pt incontinence of urine, changed bedding and placed in gown. Pt hallucinating, a/ox1, reoriented pt to place,time and situation  Ciwa=27, medicated per mar. Sent message to hospitalist regarding ciwa score.   Placed on 2L nc, vitals rechecked ,stable.  Bed alarm on and activated, sign for high risk fall placed.

## 2023-09-08 NOTE — ED NOTES
Patient remains resting in room. Respiration even and unlabored. Medically necessary equipment in room. Patient on RA. Standard fall precaution in place. Patient continues to have constant visual hallucinations, but remains calm and cooperative.

## 2023-09-08 NOTE — PROGRESS NOTES
4 Eyes Skin Assessment Completed by ISAEL Banks and ISAEL Foote.    Head Redness; red to face  Ears Redness  Nose WDL  Mouth WDL  Neck WDL  Breast/Chest: redness under folds; very tan  Shoulder Blades WDL  Spine WDL  (R) Arm/Elbow/Hand Redness and Blanching  (L) Arm/Elbow/Hand WDL  Abdomen WDL; very tan  Groin WDL  Scrotum/Coccyx/Buttocks Redness and Blanching, slow to sofía  (R) Leg Redness and Blanching scabs to right knee   (L) Leg Redness; scab to left knee; left calf swollen and warm  (R) Heel/Foot/Toe Redness and Blanching  (L) Heel/Foot/Toe Redness and Blanching          Devices In Places ECG, Blood Pressure Cuff, Pulse Ox, SCD's, and Nasal Cannula      Interventions In Place NC W/Ear Foams, Q2 Turns, and Low Air Loss Mattress    Possible Skin Injury No    Pictures Uploaded Into Epic N/A  Wound Consult Placed N/A  RN Wound Prevention Protocol Ordered No

## 2023-09-08 NOTE — DISCHARGE PLANNING
Case Management Discharge Planning    Admission Date: 9/7/2023  GMLOS:    ALOS: 1    6-Clicks ADL Score:    6-Clicks Mobility Score:    Therapy orders not indicated at this time    Anticipated Discharge Dispo: Discharge Disposition: Discharged to home/self care (01)    Per chart review pt resides in [place]    Family support:  MARIAH YOO (Aunt) Relative [phone]     Geovanna Drummond Sister   [phone]   Chon Drummond Father [phone]     Current Insurance on file: None on file at current    DME Needed: pending hospital course    Action(s) Taken: chart reviewed    Escalations Completed: PFA: screen for Medicaid    Medically Clear: No    Next Steps: f/u with pt and medical team to discuss dc needs and barriers.  Assessment to be completed to assess for HCM needs.    Barriers to Discharge: Medical clearance /Specialty Clearance    Is the patient up for discharge tomorrow: No    RNCM went bedside to do admission assessment; no family present. RNCM PC to pt contact, Mariah, who stated she is pt's aunt. Mariah stated pt's mother is up on S6. Pt's sister currently has Covid, and Pt's father is assisting in pt's mothers move to rehab today.   Mariah requests she be primary contact for pt until otherwise specified d/t rest of family not able to participate at current. Mariah stated pt is currently uninsured; RNCM let her know our PFA will complete screen. No other needs identified at current; HCM to follow for needs.    Care Transition Team Assessment    Information Source  Orientation Level: Unable to assess  Information Given By: Relative  Who is responsible for making decisions for patient? : Legal next of kin    Readmission Evaluation  Is this a readmission?: No    Interdisciplinary Discharge Planning  Does Admitting Nurse Feel This Could be a Complex Discharge?: No  Primary Care Physician: none  Lives with - Patient's Self Care Capacity: Alone and Able to Care For Self  Support Systems: Family  Member(s), Parent, Friends / Neighbors  Housing / Facility: [address] ([address] [facility])  Prior Services: Home-Independent  Durable Medical Equipment: Not Applicable    Discharge Preparedness  What is your plan after discharge?: Home with help  What are your discharge supports?: Parent, Sibling  Prior Functional Level: Independent with Activities of Daily Living, Independent with Medication Management    Functional Assesment  Prior Functional Level: Independent with Activities of Daily Living, Independent with Medication Management    Finances  Financial Barriers to Discharge: Yes  Prescription Coverage: No    Advance Directive  Advance Directive?: None    Psychological Assessment  History of Substance Abuse: Alcohol  History of Psychiatric Problems: No    Discharge Risks or Barriers  Discharge risks or barriers?: Substance abuse, Complex medical needs, Uninsured / underinsured, No PCP  Patient risk factors: Complex medical needs, Substance abuse, No PCP, Uninsured or underinsured    Anticipated Discharge Information  Discharge Disposition: Discharged to home/self care (01)

## 2023-09-08 NOTE — ED NOTES
Bedside report to receiving RN, RICU 106, pt on tele monitoring/O2, all personal items went taken with pt.

## 2023-09-08 NOTE — ASSESSMENT & PLAN NOTE
Reportedly tested positive for SARS-CoV-2 on a home test  CXR clear  Test for influenza, RSV and SARS-CoV-2  Begin dexamethasone, 6 mg daily for 10-day course

## 2023-09-08 NOTE — ED TRIAGE NOTES
"Chief Complaint   Patient presents with    Hallucinations     BIB EMS from home; family states patient has an episode of visual hallucinations this morning; talking and arguing with nobody in front of his car. Currently patient is AOX4 GCS15, denies HI/SI and any hallucinations at this time; Patient is currently on his 10th day of ETOH detox;  tested positive for Covid this morning on home test and currently  with \"pink eye'.     BP (!) 139/100   Pulse (!) 119   Temp 36.7 °C (98 °F) (Temporal)   Resp 19   Ht 1.803 m (5' 11\")   Wt (!) 127 kg (280 lb)   BMI 39.05 kg/m²     "

## 2023-09-08 NOTE — ED NOTES
Patient remains resting in room. Respiration even and unlabored. Medically necessary equipment in room. Patient on RA. Standard fall precaution in place.

## 2023-09-08 NOTE — ASSESSMENT & PLAN NOTE
Started patient on CIWA protocol  Start patient on IV thiamine, folic acid, multivitamin  Start patient on normal saline 150 cc an hour  Patient is oriented patient will need counseling about alcohol cessation    Attempted to call patients father for more accurate history. No response. Day team to speak to patients father for more history/information.

## 2023-09-08 NOTE — H&P
"Hospital Medicine History & Physical Note    Date of Service  9/8/2023    Primary Care Physician  Pcp Pt States None      Code Status  Full Code    Chief Complaint  Chief Complaint   Patient presents with    Hallucinations     BIB EMS from home; family states patient has an episode of visual hallucinations this morning; talking and arguing with nobody in front of his car. Currently patient is AOX4 GCS15, denies HI/SI and any hallucinations at this time; Patient is currently on his 10th day of ETOH detox;  tested positive for Covid this morning on home test and currently  with \"pink eye'.       History of Presenting Illness  Corona Drummond is a 58 y.o. male with history of alcohol abuse who presented 9/7/2023 with hallucinations and delirium tremors in the setting of alcohol detox.  Patient is currently lucid, however he is a poor historian.  History mostly obtained from chart review and ER physician who was able to speak to the patient's father, Vitaliy.  According to father, patient has been having a great deal of visual hallucinations.  He has been extremely paranoid.  Patient has had prior hospitalizations for similar circumstances while going through alcohol detox.  According the patient, his last drink was 10 days ago.  However the father states that this is not accurate as he has been found dating.  Bottles around the house.  When speaking with the patient, patient denies any tremors.  Denies any nausea or vomiting.  He states that he has never had seizures from alcohol withdrawal before.  He states he has never been hospitalized for alcohol withdrawal before.     In the emergency department, patient was found to be severely tremulous and tachycardic.  He was given a dose of phenobarbital and a dose of diazepam.  Patient currently remains tachycardic despite bolus of IV fluids.  Labs significant for hyponatremia and hypokalemia.    Of note, attempt was made at 12:05 AM to speak to the patient's father.  " There is no response.     Patient will be admitted for management of alcohol withdrawal    I discussed the plan of care with patient.    Review of Systems  Review of Systems   Constitutional:  Negative for chills and fever.   HENT:          Eye discharge   Eyes:  Positive for blurred vision.   Cardiovascular:  Negative for chest pain, palpitations and orthopnea.   Gastrointestinal:  Positive for nausea. Negative for abdominal pain, diarrhea and vomiting.   Musculoskeletal:  Negative for back pain and neck pain.   Neurological:  Positive for tremors. Negative for dizziness and headaches.   Psychiatric/Behavioral:  Positive for hallucinations and substance abuse. The patient is nervous/anxious and has insomnia.        Past Medical History   has no past medical history on file.    Surgical History   has no past surgical history on file.     Family History  family history is not on file.   Family history reviewed with patient. There is no family history that is pertinent to the chief complaint.     Social History   reports that he has never smoked. He has never used smokeless tobacco. He reports that he does not drink alcohol and does not use drugs.    Allergies  No Known Allergies    Medications  Prior to Admission Medications   Prescriptions Last Dose Informant Patient Reported? Taking?   BIOTIN PO 9/6/2023 at AM Patient Yes Yes   Sig: Take 1 Tablet by mouth every day.   Cholecalciferol (VITAMIN D3) 25588 UNIT Cap 9/6/2023 at AM Patient Yes Yes   Sig: Take 10,000 Units by mouth every day.   therapeutic multivitamin-minerals (THERAGRAN-M) Tab 9/6/2023 at AM Patient Yes Yes   Sig: Take 1 Tablet by mouth every day.   thiamine (VITAMIN B-1) 100 MG Tab 9/6/2023 at AM Patient Yes Yes   Sig: Take 100 mg by mouth every day.      Facility-Administered Medications: None       Physical Exam  Temp:  [36.7 °C (98 °F)] 36.7 °C (98 °F)  Pulse:  [113-119] 113  Resp:  [18-20] 18  BP: (129-139)/() 136/87  Blood Pressure: 136/87    Temperature: 36.7 °C (98 °F)   Pulse: (!) 113   Respiration: 18           Physical Exam  Constitutional:       General: He is not in acute distress.     Appearance: Normal appearance. He is obese. He is ill-appearing and diaphoretic.   HENT:      Head: Normocephalic and atraumatic.      Mouth/Throat:      Mouth: Mucous membranes are moist.   Eyes:      Pupils: Pupils are equal, round, and reactive to light.      Comments: Severe bilateral conjunctivitis with purulent drainage bilaterally   Cardiovascular:      Rate and Rhythm: Tachycardia present.      Pulses: Normal pulses.      Heart sounds: No murmur heard.     No friction rub. No gallop.   Pulmonary:      Effort: Pulmonary effort is normal. No respiratory distress.      Breath sounds: Normal breath sounds. No stridor. No wheezing or rhonchi.   Abdominal:      General: Abdomen is flat. There is no distension.      Palpations: Abdomen is soft. There is no mass.      Tenderness: There is no abdominal tenderness. There is no guarding or rebound.      Hernia: No hernia is present.   Musculoskeletal:         General: No swelling, tenderness, deformity or signs of injury.      Right lower leg: No edema.      Left lower leg: No edema.      Comments: Minimal tremors visualized   Skin:     General: Skin is warm.      Capillary Refill: Capillary refill takes less than 2 seconds.      Coloration: Skin is not jaundiced or pale.      Findings: No bruising, erythema, lesion or rash.   Neurological:      General: No focal deficit present.      Mental Status: He is alert and oriented to person, place, and time.      Cranial Nerves: No cranial nerve deficit.      Sensory: No sensory deficit.      Motor: No weakness.      Coordination: Coordination normal.   Psychiatric:         Mood and Affect: Mood normal.         Laboratory:  Recent Labs     09/07/23 2050   WBC 8.0   RBC 5.85   HEMOGLOBIN 19.7*   HEMATOCRIT 57.9*   MCV 99.0*   MCH 33.7*   MCHC 34.0   RDW 51.5*   PLATELETCT  "90*   MPV 10.8     Recent Labs     09/07/23 2050   SODIUM 133*   POTASSIUM 3.3*   CHLORIDE 92*   CO2 20   GLUCOSE 99   BUN 22   CREATININE 1.33   CALCIUM 10.2     Recent Labs     09/07/23 2050   ALTSGPT 97*   ASTSGOT 142*   ALKPHOSPHAT 100*   TBILIRUBIN 1.0   LIPASE 29   GLUCOSE 99     Recent Labs     09/07/23 2050   APTT 24.9   INR 0.97     No results for input(s): \"NTPROBNP\" in the last 72 hours.      Recent Labs     09/07/23 2050   TROPONINT 23*       Imaging:  DX-CHEST-PORTABLE (1 VIEW)   Final Result         1.  No acute cardiopulmonary disease.          X-Ray:  I have personally reviewed the images and compared with prior images.  EKG:  I have personally reviewed the images and compared with prior images.    Assessment/Plan:  Justification for Admission Status  I anticipate this patient will require at least two midnights for appropriate medical management, necessitating inpatient admission because alcohol withdrawal with delirium tremors, hallucinations.  Patient will need close monitoring and CIWA protocol.    Patient will need a Telemetry bed on MEDICAL service .  The need is secondary to alcohol withdrawal with delirium tremors and hallucination.    * Alcohol intoxication delirium with moderate or severe use disorder (HCC)- (present on admission)  Assessment & Plan  Started patient on CIWA protocol  Start patient on IV thiamine, folic acid, multivitamin  Start patient on normal saline 150 cc an hour  Patient is oriented patient will need counseling about alcohol cessation    Attempted to call patients father for more accurate history. No response. Day team to speak to patients father for more history/information.     Conjunctivitis of both eyes  Assessment & Plan  Start patient on Polytrim for bilateral conjunctivitis    Tachycardia  Assessment & Plan  Secondary to alcohol withdrawal and dehydration  Continue with IV fluids  CIWA protocol    Alcoholic hepatitis  Assessment & Plan  Patient with " transaminitis likely secondary to alcoholic hepatitis      Delirium tremens (HCC)  Assessment & Plan  Patient has been doing detox at home.  He was sent to the emergency department by his father for worsening hallucinations, and tremors.  Continue CIWA protocol    Hyponatremia  Assessment & Plan  Secondary to alcohol abuse  Continue with normal saline    Hypokalemia  Assessment & Plan  Patient presents with potassium level 3.3  Repletion with IV and p.o. potassium  Goal greater than 4.0        VTE prophylaxis: enoxaparin ppx

## 2023-09-09 PROBLEM — F10.931 ALCOHOL WITHDRAWAL DELIRIUM (HCC): Status: ACTIVE | Noted: 2023-09-09

## 2023-09-09 PROBLEM — G47.33 OSA (OBSTRUCTIVE SLEEP APNEA): Status: ACTIVE | Noted: 2023-09-09

## 2023-09-09 LAB
ALBUMIN SERPL BCP-MCNC: 3.3 G/DL (ref 3.2–4.9)
ALBUMIN/GLOB SERPL: 1.3 G/DL
ALP SERPL-CCNC: 72 U/L (ref 30–99)
ALT SERPL-CCNC: 51 U/L (ref 2–50)
ANION GAP SERPL CALC-SCNC: 13 MMOL/L (ref 7–16)
AST SERPL-CCNC: 46 U/L (ref 12–45)
BASOPHILS # BLD AUTO: 0.2 % (ref 0–1.8)
BASOPHILS # BLD: 0.01 K/UL (ref 0–0.12)
BILIRUB SERPL-MCNC: 0.7 MG/DL (ref 0.1–1.5)
BUN SERPL-MCNC: 17 MG/DL (ref 8–22)
CALCIUM ALBUM COR SERPL-MCNC: 9.3 MG/DL (ref 8.5–10.5)
CALCIUM SERPL-MCNC: 8.7 MG/DL (ref 8.5–10.5)
CHLORIDE SERPL-SCNC: 100 MMOL/L (ref 96–112)
CO2 SERPL-SCNC: 20 MMOL/L (ref 20–33)
CREAT SERPL-MCNC: 0.64 MG/DL (ref 0.5–1.4)
EOSINOPHIL # BLD AUTO: 0.03 K/UL (ref 0–0.51)
EOSINOPHIL NFR BLD: 0.5 % (ref 0–6.9)
ERYTHROCYTE [DISTWIDTH] IN BLOOD BY AUTOMATED COUNT: 50.4 FL (ref 35.9–50)
GFR SERPLBLD CREATININE-BSD FMLA CKD-EPI: 109 ML/MIN/1.73 M 2
GLOBULIN SER CALC-MCNC: 2.6 G/DL (ref 1.9–3.5)
GLUCOSE SERPL-MCNC: 110 MG/DL (ref 65–99)
HCT VFR BLD AUTO: 50 % (ref 42–52)
HGB BLD-MCNC: 16.9 G/DL (ref 14–18)
IMM GRANULOCYTES # BLD AUTO: 0.02 K/UL (ref 0–0.11)
IMM GRANULOCYTES NFR BLD AUTO: 0.3 % (ref 0–0.9)
LYMPHOCYTES # BLD AUTO: 0.45 K/UL (ref 1–4.8)
LYMPHOCYTES NFR BLD: 6.8 % (ref 22–41)
MAGNESIUM SERPL-MCNC: 2 MG/DL (ref 1.5–2.5)
MCH RBC QN AUTO: 34.1 PG (ref 27–33)
MCHC RBC AUTO-ENTMCNC: 33.8 G/DL (ref 32.3–36.5)
MCV RBC AUTO: 100.8 FL (ref 81.4–97.8)
MONOCYTES # BLD AUTO: 0.54 K/UL (ref 0–0.85)
MONOCYTES NFR BLD AUTO: 8.1 % (ref 0–13.4)
NEUTROPHILS # BLD AUTO: 5.59 K/UL (ref 1.82–7.42)
NEUTROPHILS NFR BLD: 84.1 % (ref 44–72)
NRBC # BLD AUTO: 0 K/UL
NRBC BLD-RTO: 0 /100 WBC (ref 0–0.2)
PHOSPHATE SERPL-MCNC: 2.6 MG/DL (ref 2.5–4.5)
PLATELET # BLD AUTO: 79 K/UL (ref 164–446)
PLATELETS.RETICULATED NFR BLD AUTO: 7.1 % (ref 0.6–13.1)
PMV BLD AUTO: 10.3 FL (ref 9–12.9)
POTASSIUM SERPL-SCNC: 4.4 MMOL/L (ref 3.6–5.5)
PROT SERPL-MCNC: 5.9 G/DL (ref 6–8.2)
RBC # BLD AUTO: 4.96 M/UL (ref 4.7–6.1)
SODIUM SERPL-SCNC: 133 MMOL/L (ref 135–145)
WBC # BLD AUTO: 6.6 K/UL (ref 4.8–10.8)

## 2023-09-09 PROCEDURE — 83735 ASSAY OF MAGNESIUM: CPT

## 2023-09-09 PROCEDURE — 85055 RETICULATED PLATELET ASSAY: CPT

## 2023-09-09 PROCEDURE — A9270 NON-COVERED ITEM OR SERVICE: HCPCS | Performed by: HOSPITALIST

## 2023-09-09 PROCEDURE — 700105 HCHG RX REV CODE 258: Performed by: INTERNAL MEDICINE

## 2023-09-09 PROCEDURE — 80053 COMPREHEN METABOLIC PANEL: CPT

## 2023-09-09 PROCEDURE — 99232 SBSQ HOSP IP/OBS MODERATE 35: CPT | Performed by: HOSPITALIST

## 2023-09-09 PROCEDURE — 84100 ASSAY OF PHOSPHORUS: CPT

## 2023-09-09 PROCEDURE — 700111 HCHG RX REV CODE 636 W/ 250 OVERRIDE (IP): Performed by: INTERNAL MEDICINE

## 2023-09-09 PROCEDURE — 700102 HCHG RX REV CODE 250 W/ 637 OVERRIDE(OP): Performed by: STUDENT IN AN ORGANIZED HEALTH CARE EDUCATION/TRAINING PROGRAM

## 2023-09-09 PROCEDURE — A9270 NON-COVERED ITEM OR SERVICE: HCPCS | Performed by: STUDENT IN AN ORGANIZED HEALTH CARE EDUCATION/TRAINING PROGRAM

## 2023-09-09 PROCEDURE — 85025 COMPLETE CBC W/AUTO DIFF WBC: CPT

## 2023-09-09 PROCEDURE — 770020 HCHG ROOM/CARE - TELE (206)

## 2023-09-09 PROCEDURE — 700102 HCHG RX REV CODE 250 W/ 637 OVERRIDE(OP): Performed by: HOSPITALIST

## 2023-09-09 RX ORDER — LORAZEPAM 2 MG/ML
1.5 INJECTION INTRAMUSCULAR
Status: DISCONTINUED | OUTPATIENT
Start: 2023-09-09 | End: 2023-09-10

## 2023-09-09 RX ORDER — LORAZEPAM 2 MG/1
2 TABLET ORAL
Status: DISCONTINUED | OUTPATIENT
Start: 2023-09-09 | End: 2023-09-10

## 2023-09-09 RX ORDER — LORAZEPAM 2 MG/ML
1 INJECTION INTRAMUSCULAR
Status: DISCONTINUED | OUTPATIENT
Start: 2023-09-09 | End: 2023-09-10

## 2023-09-09 RX ORDER — LORAZEPAM 2 MG/1
4 TABLET ORAL
Status: DISCONTINUED | OUTPATIENT
Start: 2023-09-09 | End: 2023-09-10

## 2023-09-09 RX ORDER — SODIUM CHLORIDE, SODIUM GLUCONATE, SODIUM ACETATE, POTASSIUM CHLORIDE AND MAGNESIUM CHLORIDE 526; 502; 368; 37; 30 MG/100ML; MG/100ML; MG/100ML; MG/100ML; MG/100ML
INJECTION, SOLUTION INTRAVENOUS CONTINUOUS
Status: DISCONTINUED | OUTPATIENT
Start: 2023-09-09 | End: 2023-09-09

## 2023-09-09 RX ORDER — LORAZEPAM 2 MG/ML
2 INJECTION INTRAMUSCULAR
Status: DISCONTINUED | OUTPATIENT
Start: 2023-09-09 | End: 2023-09-10

## 2023-09-09 RX ORDER — DEXAMETHASONE 6 MG/1
6 TABLET ORAL DAILY
Status: DISCONTINUED | OUTPATIENT
Start: 2023-09-10 | End: 2023-09-12 | Stop reason: HOSPADM

## 2023-09-09 RX ORDER — LORAZEPAM 2 MG/ML
0.5 INJECTION INTRAMUSCULAR EVERY 4 HOURS PRN
Status: DISCONTINUED | OUTPATIENT
Start: 2023-09-09 | End: 2023-09-10

## 2023-09-09 RX ORDER — LORAZEPAM 1 MG/1
0.5 TABLET ORAL EVERY 4 HOURS PRN
Status: DISCONTINUED | OUTPATIENT
Start: 2023-09-09 | End: 2023-09-10

## 2023-09-09 RX ORDER — LORAZEPAM 1 MG/1
1 TABLET ORAL EVERY 4 HOURS PRN
Status: DISCONTINUED | OUTPATIENT
Start: 2023-09-09 | End: 2023-09-10

## 2023-09-09 RX ORDER — MAGNESIUM SULFATE HEPTAHYDRATE 40 MG/ML
2 INJECTION, SOLUTION INTRAVENOUS ONCE
Status: ACTIVE | OUTPATIENT
Start: 2023-09-09 | End: 2023-09-10

## 2023-09-09 RX ADMIN — POLYMYXIN B SULFATE AND TRIMETHOPRIM 1 DROP: 10000; 1 SOLUTION OPHTHALMIC at 14:00

## 2023-09-09 RX ADMIN — SODIUM CHLORIDE, SODIUM GLUCONATE, SODIUM ACETATE, POTASSIUM CHLORIDE AND MAGNESIUM CHLORIDE: 526; 502; 368; 37; 30 INJECTION, SOLUTION INTRAVENOUS at 06:45

## 2023-09-09 RX ADMIN — RIVAROXABAN 10 MG: 10 TABLET, FILM COATED ORAL at 18:12

## 2023-09-09 RX ADMIN — THIAMINE HYDROCHLORIDE 500 MG: 100 INJECTION, SOLUTION INTRAMUSCULAR; INTRAVENOUS at 05:22

## 2023-09-09 RX ADMIN — POLYMYXIN B SULFATE AND TRIMETHOPRIM 1 DROP: 10000; 1 SOLUTION OPHTHALMIC at 05:11

## 2023-09-09 RX ADMIN — ERYTHROMYCIN 1 APPLICATION: 5 OINTMENT OPHTHALMIC at 12:00

## 2023-09-09 RX ADMIN — ERYTHROMYCIN 1 APPLICATION: 5 OINTMENT OPHTHALMIC at 00:00

## 2023-09-09 RX ADMIN — DEXAMETHASONE SODIUM PHOSPHATE 6 MG: 4 INJECTION INTRA-ARTICULAR; INTRALESIONAL; INTRAMUSCULAR; INTRAVENOUS; SOFT TISSUE at 05:12

## 2023-09-09 RX ADMIN — THERA TABS 1 TABLET: TAB at 05:12

## 2023-09-09 RX ADMIN — ERYTHROMYCIN 1 APPLICATION: 5 OINTMENT OPHTHALMIC at 05:12

## 2023-09-09 RX ADMIN — FOLIC ACID 1 MG: 1 TABLET ORAL at 05:12

## 2023-09-09 RX ADMIN — POLYMYXIN B SULFATE AND TRIMETHOPRIM 1 DROP: 10000; 1 SOLUTION OPHTHALMIC at 22:35

## 2023-09-09 RX ADMIN — ERYTHROMYCIN 1 APPLICATION: 5 OINTMENT OPHTHALMIC at 18:12

## 2023-09-09 RX ADMIN — POLYMYXIN B SULFATE AND TRIMETHOPRIM 1 DROP: 10000; 1 SOLUTION OPHTHALMIC at 18:12

## 2023-09-09 RX ADMIN — POLYMYXIN B SULFATE AND TRIMETHOPRIM 1 DROP: 10000; 1 SOLUTION OPHTHALMIC at 09:10

## 2023-09-09 RX ADMIN — SENNOSIDES AND DOCUSATE SODIUM 2 TABLET: 50; 8.6 TABLET ORAL at 18:12

## 2023-09-09 RX ADMIN — SENNOSIDES AND DOCUSATE SODIUM 2 TABLET: 50; 8.6 TABLET ORAL at 05:11

## 2023-09-09 RX ADMIN — POLYMYXIN B SULFATE AND TRIMETHOPRIM 1 DROP: 10000; 1 SOLUTION OPHTHALMIC at 02:00

## 2023-09-09 ASSESSMENT — LIFESTYLE VARIABLES
TOTAL SCORE: 3
NAUSEA AND VOMITING: NO NAUSEA AND NO VOMITING
TOTAL SCORE: 0
ORIENTATION AND CLOUDING OF SENSORIUM: ORIENTED AND CAN DO SERIAL ADDITIONS
DOES PATIENT WANT TO TALK TO SOMEONE ABOUT QUITTING: NO
TOTAL SCORE: 3
AUDITORY DISTURBANCES: NOT PRESENT
CONSUMPTION TOTAL: POSITIVE
ANXIETY: NO ANXIETY (AT EASE)
HAVE PEOPLE ANNOYED YOU BY CRITICIZING YOUR DRINKING: NO
ON A TYPICAL DAY WHEN YOU DRINK ALCOHOL HOW MANY DRINKS DO YOU HAVE: 3
ALCOHOL_USE: YES
AGITATION: NORMAL ACTIVITY
VISUAL DISTURBANCES: NOT PRESENT
AVERAGE NUMBER OF DAYS PER WEEK YOU HAVE A DRINK CONTAINING ALCOHOL: 4
HEADACHE, FULLNESS IN HEAD: NOT PRESENT
EVER FELT BAD OR GUILTY ABOUT YOUR DRINKING: YES
SUBSTANCE_ABUSE: 1
DOES PATIENT WANT TO STOP DRINKING: YES
EVER HAD A DRINK FIRST THING IN THE MORNING TO STEADY YOUR NERVES TO GET RID OF A HANGOVER: YES
PAROXYSMAL SWEATS: NO SWEAT VISIBLE
HAVE YOU EVER FELT YOU SHOULD CUT DOWN ON YOUR DRINKING: YES
TOTAL SCORE: 3
HOW MANY TIMES IN THE PAST YEAR HAVE YOU HAD 5 OR MORE DRINKS IN A DAY: 0
TREMOR: NO TREMOR

## 2023-09-09 ASSESSMENT — COGNITIVE AND FUNCTIONAL STATUS - GENERAL
STANDING UP FROM CHAIR USING ARMS: A LITTLE
EATING MEALS: A LITTLE
WALKING IN HOSPITAL ROOM: A LITTLE
TURNING FROM BACK TO SIDE WHILE IN FLAT BAD: A LITTLE
TOILETING: A LITTLE
DRESSING REGULAR LOWER BODY CLOTHING: A LITTLE
HELP NEEDED FOR BATHING: A LITTLE
DRESSING REGULAR UPPER BODY CLOTHING: A LITTLE
CLIMB 3 TO 5 STEPS WITH RAILING: A LITTLE
MOVING TO AND FROM BED TO CHAIR: A LITTLE
MOVING FROM LYING ON BACK TO SITTING ON SIDE OF FLAT BED: A LITTLE
PERSONAL GROOMING: A LITTLE
SUGGESTED CMS G CODE MODIFIER MOBILITY: CK
SUGGESTED CMS G CODE MODIFIER DAILY ACTIVITY: CK
DAILY ACTIVITIY SCORE: 18
MOBILITY SCORE: 18

## 2023-09-09 ASSESSMENT — PATIENT HEALTH QUESTIONNAIRE - PHQ9
7. TROUBLE CONCENTRATING ON THINGS, SUCH AS READING THE NEWSPAPER OR WATCHING TELEVISION: NOT AT ALL
9. THOUGHTS THAT YOU WOULD BE BETTER OFF DEAD, OR OF HURTING YOURSELF: NOT AT ALL
SUM OF ALL RESPONSES TO PHQ QUESTIONS 1-9: 2
6. FEELING BAD ABOUT YOURSELF - OR THAT YOU ARE A FAILURE OR HAVE LET YOURSELF OR YOUR FAMILY DOWN: NOT AL ALL
6. FEELING BAD ABOUT YOURSELF - OR THAT YOU ARE A FAILURE OR HAVE LET YOURSELF OR YOUR FAMILY DOWN: NOT AL ALL
SUM OF ALL RESPONSES TO PHQ9 QUESTIONS 1 AND 2: 1
7. TROUBLE CONCENTRATING ON THINGS, SUCH AS READING THE NEWSPAPER OR WATCHING TELEVISION: NOT AT ALL
8. MOVING OR SPEAKING SO SLOWLY THAT OTHER PEOPLE COULD HAVE NOTICED. OR THE OPPOSITE, BEING SO FIGETY OR RESTLESS THAT YOU HAVE BEEN MOVING AROUND A LOT MORE THAN USUAL: NOT AT ALL
9. THOUGHTS THAT YOU WOULD BE BETTER OFF DEAD, OR OF HURTING YOURSELF: NOT AT ALL
3. TROUBLE FALLING OR STAYING ASLEEP OR SLEEPING TOO MUCH: SEVERAL DAYS
3. TROUBLE FALLING OR STAYING ASLEEP OR SLEEPING TOO MUCH: SEVERAL DAYS
5. POOR APPETITE OR OVEREATING: NOT AT ALL
SUM OF ALL RESPONSES TO PHQ9 QUESTIONS 1 AND 2: 1
4. FEELING TIRED OR HAVING LITTLE ENERGY: NOT AT ALL
1. LITTLE INTEREST OR PLEASURE IN DOING THINGS: NOT AT ALL
SUM OF ALL RESPONSES TO PHQ QUESTIONS 1-9: 2
5. POOR APPETITE OR OVEREATING: NOT AT ALL
2. FEELING DOWN, DEPRESSED, IRRITABLE, OR HOPELESS: SEVERAL DAYS
8. MOVING OR SPEAKING SO SLOWLY THAT OTHER PEOPLE COULD HAVE NOTICED. OR THE OPPOSITE, BEING SO FIGETY OR RESTLESS THAT YOU HAVE BEEN MOVING AROUND A LOT MORE THAN USUAL: NOT AT ALL
4. FEELING TIRED OR HAVING LITTLE ENERGY: NOT AT ALL
1. LITTLE INTEREST OR PLEASURE IN DOING THINGS: NOT AT ALL
2. FEELING DOWN, DEPRESSED, IRRITABLE, OR HOPELESS: SEVERAL DAYS

## 2023-09-09 ASSESSMENT — PAIN DESCRIPTION - PAIN TYPE: TYPE: ACUTE PAIN

## 2023-09-09 ASSESSMENT — ENCOUNTER SYMPTOMS
MEMORY LOSS: 1
FEVER: 0
SHORTNESS OF BREATH: 0
CHILLS: 0
TREMORS: 1

## 2023-09-09 ASSESSMENT — FIBROSIS 4 INDEX: FIB4 SCORE: 4.81

## 2023-09-09 NOTE — ASSESSMENT & PLAN NOTE
Likely due to bone marrow suppression secondary to alcohol  High MCV  - TSH, B12 and fibrinogen pending

## 2023-09-09 NOTE — ASSESSMENT & PLAN NOTE
+ test  Lungs clear on exam, general fatigue on interview  Chest xray is negative  - Continue Decadron  - Isolation precautions.

## 2023-09-09 NOTE — ASSESSMENT & PLAN NOTE
Patient and family report different dates of last drink  With initial admit to the floor though required transfer to the ICU for IV phenobarbital, now transferred back to floor  S/p IV detox bag as well as aida rudd K  - Continue [facility] protocol  - Thiamine, Folate and Multivitamin repletion  - Case management for outpatient detox options.

## 2023-09-09 NOTE — CARE PLAN
The patient is Watcher - Medium risk of patient condition declining or worsening         Progress made toward(s) clinical / shift goals:    Problem: Optimal Care for Alcohol Withdrawal  Goal: Optimal Care for the alcohol withdrawal patient  Outcome: Progressing     Problem: Seizure Precautions  Goal: Implementation of seizure precautions  Outcome: Progressing       Patient is not progressing towards the following goals:

## 2023-09-09 NOTE — CARE PLAN
The patient is Watcher - Medium risk of patient condition declining or worsening         Progress made toward(s) clinical / shift goals:    Problem: Knowledge Deficit - Standard  Goal: Patient and family/care givers will demonstrate understanding of plan of care, disease process/condition, diagnostic tests and medications  Outcome: Progressing     Problem: Optimal Care for Alcohol Withdrawal  Goal: Optimal Care for the alcohol withdrawal patient  Outcome: Progressing     Problem: Seizure Precautions  Goal: Implementation of seizure precautions  Outcome: Progressing     Problem: Lifestyle Changes  Goal: Patient's ability to identify lifestyle changes and available resources to help reduce recurrence of condition will improve  Outcome: Progressing     Problem: Psychosocial  Goal: Patient's level of anxiety will decrease  Outcome: Progressing  Goal: Spiritual and cultural needs incorporated into hospitalization  Outcome: Progressing     Problem: Risk for Aspiration  Goal: Patient's risk for aspiration will be absent or decrease  Outcome: Progressing     Problem: Pain - Standard  Goal: Alleviation of pain or a reduction in pain to the patient’s comfort goal  Outcome: Progressing     Problem: Skin Integrity  Goal: Skin integrity is maintained or improved  Outcome: Progressing     Problem: Fall Risk  Goal: Patient will remain free from falls  Outcome: Progressing     Problem: Safety - Medical Restraint  Goal: Remains free of injury from restraints (Restraint for Interference with Medical Device)  Outcome: Progressing  Goal: Free from restraint(s) (Restraint for Interference with Medical Device)  Outcome: Progressing       Patient is not progressing towards the following goals:

## 2023-09-09 NOTE — PROGRESS NOTES
[facility] Medicine Daily Progress Note    Date of Service  9/9/2023    Chief Complaint  Corona Drummond is a 58 y.o. male admitted 9/7/2023 with detox    Hospital Course  Mr. Drummond is a 58 y.o. male with history of alcohol abuse who presented 9/7/2023 with hallucinations and delirium tremors in the setting of alcohol detox.  Patient is currently lucid, however he is a poor historian.  History mostly obtained from chart review and ER physician who was able to speak to the patient's father, Vitaliy.  According to father, patient has been having a great deal of visual hallucinations.  He has been extremely paranoid.  Patient has had prior hospitalizations for similar circumstances while going through alcohol detox.  According the patient, his last drink was 10 days ago.  However the father states that this is not accurate as he has been found dating.  Bottles around the house.  When speaking with the patient, patient denies any tremors.  Denies any nausea or vomiting.  He states that he has never had seizures from alcohol withdrawal before.  He states he has never been hospitalized for alcohol withdrawal before.   In the emergency department, patient was found to be severely tremulous and tachycardic.  He was given a dose of phenobarbital and a dose of diazepam.  Patient currently remains tachycardic despite bolus of IV fluids.  Labs significant for hyponatremia and hypokalemia. COVID + and requiring supplemental oxygen thus decadron was started.  On 9/8/2023 his CIWA scores went up substantially and he has received lorazepam and diazepam and was admitted to the intensive care unit for IV phenobarbital.         Interval Problem Update  9/9/2023: Mr. Waldrop was seen and evaluated in the ICU. He has been on IV fluids. He is awake and lucid today. He is on 4 liters of oxygen. Slight tremor now improved. He lives with his parents. He has been NPO but will be advanced to a diet and turn IV fluids off. Change  decadron to oral.     I have discussed this patient's plan of care and discharge plan at IDT rounds today with Case Management, Nursing, Nursing leadership, and other members of the IDT team.    Consultants/Specialty  critical care    Code Status  Full Code    Disposition  The patient is not medically cleared for discharge to home or a post-acute facility.  Anticipate discharge to: home with close outpatient follow-up    I have placed the appropriate orders for post-discharge needs.    Review of Systems  Review of Systems   Constitutional:  Negative for chills and fever.   Respiratory:  Negative for shortness of breath.    Cardiovascular:  Negative for chest pain.   Neurological:  Positive for tremors.   Psychiatric/Behavioral:  Positive for memory loss and substance abuse.    All other systems reviewed and are negative.       Physical Exam  Temp:  [35.9 °C (96.6 °F)] 35.9 °C (96.6 °F)  Pulse:  [67-99] 76  Resp:  [16-59] 17  BP: (113-157)/(71-94) 134/86  SpO2:  [88 %-97 %] 95 %    Physical Exam  Vitals and nursing note reviewed.   Constitutional:       General: He is not in acute distress.     Appearance: He is ill-appearing. He is not toxic-appearing.   Cardiovascular:      Rate and Rhythm: Normal rate and regular rhythm.   Pulmonary:      Effort: Pulmonary effort is normal.      Breath sounds: Normal breath sounds.      Comments: 4 liters nasal cannula oxygen  Abdominal:      General: There is distension.   Skin:     Comments: Face is red   Neurological:      Mental Status: He is alert.      Comments: No tremor   Psychiatric:         Mood and Affect: Mood normal.         Behavior: Behavior normal.         Fluids    Intake/Output Summary (Last 24 hours) at 9/9/2023 1204  Last data filed at 9/9/2023 1000  Gross per 24 hour   Intake 2183.9 ml   Output 1650 ml   Net 533.9 ml       Laboratory  Recent Labs     09/07/23  2050 09/08/23  0446 09/09/23  0910   WBC 8.0 6.4 6.6   RBC 5.85 5.69 4.96   HEMOGLOBIN 19.7* 18.8*  16.9   HEMATOCRIT 57.9* 57.8* 50.0   MCV 99.0* 101.6* 100.8*   MCH 33.7* 33.0 34.1*   MCHC 34.0 32.5 33.8   RDW 51.5* 52.9* 50.4*   PLATELETCT 90* 85* 79*   MPV 10.8 10.2 10.3     Recent Labs     09/07/23 2050 09/08/23  0643 09/09/23  0910   SODIUM 133* 134* 133*   POTASSIUM 3.3* 3.5* 4.4   CHLORIDE 92* 97 100   CO2 20 19* 20   GLUCOSE 99 105* 110*   BUN 22 18 17   CREATININE 1.33 1.01 0.64   CALCIUM 10.2 9.3 8.7     Recent Labs     09/07/23 2050   APTT 24.9   INR 0.97               Imaging  DX-CHEST-PORTABLE (1 VIEW)   Final Result         1.  No acute cardiopulmonary disease.           Assessment/Plan  * Alcohol dependence with withdrawal delirium (HCC)- (present on admission)  Assessment & Plan  With initial admit to the floor though required transfer to the ICU for IV phenobarbital  Transfer to medical  S/p IV detox bag as well as mag, phos, K  He remains calm now s/p phenobarbital and will monitor for further detox in which CIWA will be utilized (CIWA protocol has been ordered)  Case management for outpatient detox options.     JARVIS (obstructive sleep apnea)- (present on admission)  Assessment & Plan  Diagnosed 10 years ago but he was intolerant to CPAP and hasn't used it.    SARS-CoV-2 positive- (present on admission)  Assessment & Plan  + test   He is hypoxic thus decadron has been started  Chest xray is negative  Isolation precautions.     Hypomagnesemia- (present on admission)  Assessment & Plan  Replacement was given    Thrombocytopenia (HCC)- (present on admission)  Assessment & Plan  Likely due to bone marrow suppression secondary to alcohol    Conjunctivitis of both eyes  Assessment & Plan  Start patient on Polytrim for bilateral conjunctivitis    Alcoholic hepatitis  Assessment & Plan  Patient with transaminitis likely secondary to alcoholic hepatitis      Hyponatremia  Assessment & Plan  Secondary to dedydration  s/p normal saline    Hypokalemia  Assessment & Plan  Patient presents with potassium  level 3.3  Repletion with IV and p.o. potassium  resolved         VTE prophylaxis:    Xarelto 10mg daily as prophylaxis      I have performed a physical exam and reviewed and updated ROS and Plan today (9/9/2023). In review of yesterday's note (9/8/2023), there are no changes except as documented above.

## 2023-09-10 PROBLEM — R31.9 HEMATURIA: Status: ACTIVE | Noted: 2023-09-10

## 2023-09-10 LAB
ALBUMIN SERPL BCP-MCNC: 3.4 G/DL (ref 3.2–4.9)
ALBUMIN/GLOB SERPL: 1.3 G/DL
ALP SERPL-CCNC: 72 U/L (ref 30–99)
ALT SERPL-CCNC: 45 U/L (ref 2–50)
ANION GAP SERPL CALC-SCNC: 10 MMOL/L (ref 7–16)
AST SERPL-CCNC: 31 U/L (ref 12–45)
BASOPHILS # BLD AUTO: 0.3 % (ref 0–1.8)
BASOPHILS # BLD: 0.02 K/UL (ref 0–0.12)
BILIRUB SERPL-MCNC: 0.6 MG/DL (ref 0.1–1.5)
BUN SERPL-MCNC: 19 MG/DL (ref 8–22)
CALCIUM ALBUM COR SERPL-MCNC: 9.2 MG/DL (ref 8.5–10.5)
CALCIUM SERPL-MCNC: 8.7 MG/DL (ref 8.5–10.5)
CHLORIDE SERPL-SCNC: 100 MMOL/L (ref 96–112)
CO2 SERPL-SCNC: 24 MMOL/L (ref 20–33)
CREAT SERPL-MCNC: 0.73 MG/DL (ref 0.5–1.4)
EOSINOPHIL # BLD AUTO: 0.1 K/UL (ref 0–0.51)
EOSINOPHIL NFR BLD: 1.4 % (ref 0–6.9)
ERYTHROCYTE [DISTWIDTH] IN BLOOD BY AUTOMATED COUNT: 49.3 FL (ref 35.9–50)
ERYTHROCYTE [DISTWIDTH] IN BLOOD BY AUTOMATED COUNT: 50.5 FL (ref 35.9–50)
FIBRINOGEN PPP-MCNC: 419 MG/DL (ref 215–460)
GFR SERPLBLD CREATININE-BSD FMLA CKD-EPI: 105 ML/MIN/1.73 M 2
GLOBULIN SER CALC-MCNC: 2.7 G/DL (ref 1.9–3.5)
GLUCOSE SERPL-MCNC: 100 MG/DL (ref 65–99)
HCT VFR BLD AUTO: 50.1 % (ref 42–52)
HCT VFR BLD AUTO: 52.5 % (ref 42–52)
HGB BLD-MCNC: 16.8 G/DL (ref 14–18)
HGB BLD-MCNC: 17.4 G/DL (ref 14–18)
IMM GRANULOCYTES # BLD AUTO: 0.03 K/UL (ref 0–0.11)
IMM GRANULOCYTES NFR BLD AUTO: 0.4 % (ref 0–0.9)
LYMPHOCYTES # BLD AUTO: 0.91 K/UL (ref 1–4.8)
LYMPHOCYTES NFR BLD: 12.9 % (ref 22–41)
MAGNESIUM SERPL-MCNC: 2 MG/DL (ref 1.5–2.5)
MCH RBC QN AUTO: 33 PG (ref 27–33)
MCH RBC QN AUTO: 34.1 PG (ref 27–33)
MCHC RBC AUTO-ENTMCNC: 33.1 G/DL (ref 32.3–36.5)
MCHC RBC AUTO-ENTMCNC: 33.5 G/DL (ref 32.3–36.5)
MCV RBC AUTO: 101.8 FL (ref 81.4–97.8)
MCV RBC AUTO: 99.4 FL (ref 81.4–97.8)
MONOCYTES # BLD AUTO: 0.89 K/UL (ref 0–0.85)
MONOCYTES NFR BLD AUTO: 12.6 % (ref 0–13.4)
NEUTROPHILS # BLD AUTO: 5.11 K/UL (ref 1.82–7.42)
NEUTROPHILS NFR BLD: 72.4 % (ref 44–72)
NRBC # BLD AUTO: 0 K/UL
NRBC BLD-RTO: 0 /100 WBC (ref 0–0.2)
PHOSPHATE SERPL-MCNC: 2.9 MG/DL (ref 2.5–4.5)
PLATELET # BLD AUTO: 114 K/UL (ref 164–446)
PLATELET # BLD AUTO: 91 K/UL (ref 164–446)
PLATELETS.RETICULATED NFR BLD AUTO: 10 % (ref 0.6–13.1)
PMV BLD AUTO: 10.5 FL (ref 9–12.9)
PMV BLD AUTO: 10.8 FL (ref 9–12.9)
POTASSIUM SERPL-SCNC: 3.6 MMOL/L (ref 3.6–5.5)
PROT SERPL-MCNC: 6.1 G/DL (ref 6–8.2)
RBC # BLD AUTO: 4.92 M/UL (ref 4.7–6.1)
RBC # BLD AUTO: 5.28 M/UL (ref 4.7–6.1)
SODIUM SERPL-SCNC: 134 MMOL/L (ref 135–145)
TSH SERPL DL<=0.005 MIU/L-ACNC: 2.17 UIU/ML (ref 0.38–5.33)
VIT B12 SERPL-MCNC: 1458 PG/ML (ref 211–911)
WBC # BLD AUTO: 7.1 K/UL (ref 4.8–10.8)
WBC # BLD AUTO: 8.5 K/UL (ref 4.8–10.8)

## 2023-09-10 PROCEDURE — A9270 NON-COVERED ITEM OR SERVICE: HCPCS | Performed by: STUDENT IN AN ORGANIZED HEALTH CARE EDUCATION/TRAINING PROGRAM

## 2023-09-10 PROCEDURE — 85025 COMPLETE CBC W/AUTO DIFF WBC: CPT

## 2023-09-10 PROCEDURE — A9270 NON-COVERED ITEM OR SERVICE: HCPCS

## 2023-09-10 PROCEDURE — 700105 HCHG RX REV CODE 258: Performed by: INTERNAL MEDICINE

## 2023-09-10 PROCEDURE — A9270 NON-COVERED ITEM OR SERVICE: HCPCS | Performed by: HOSPITALIST

## 2023-09-10 PROCEDURE — 700111 HCHG RX REV CODE 636 W/ 250 OVERRIDE (IP): Performed by: INTERNAL MEDICINE

## 2023-09-10 PROCEDURE — 85384 FIBRINOGEN ACTIVITY: CPT

## 2023-09-10 PROCEDURE — 82607 VITAMIN B-12: CPT

## 2023-09-10 PROCEDURE — 700102 HCHG RX REV CODE 250 W/ 637 OVERRIDE(OP): Performed by: HOSPITALIST

## 2023-09-10 PROCEDURE — 83735 ASSAY OF MAGNESIUM: CPT

## 2023-09-10 PROCEDURE — 700102 HCHG RX REV CODE 250 W/ 637 OVERRIDE(OP): Performed by: STUDENT IN AN ORGANIZED HEALTH CARE EDUCATION/TRAINING PROGRAM

## 2023-09-10 PROCEDURE — 51798 US URINE CAPACITY MEASURE: CPT

## 2023-09-10 PROCEDURE — 700102 HCHG RX REV CODE 250 W/ 637 OVERRIDE(OP)

## 2023-09-10 PROCEDURE — 84443 ASSAY THYROID STIM HORMONE: CPT

## 2023-09-10 PROCEDURE — 85055 RETICULATED PLATELET ASSAY: CPT

## 2023-09-10 PROCEDURE — 84100 ASSAY OF PHOSPHORUS: CPT

## 2023-09-10 PROCEDURE — 770020 HCHG ROOM/CARE - TELE (206)

## 2023-09-10 PROCEDURE — 85027 COMPLETE CBC AUTOMATED: CPT

## 2023-09-10 PROCEDURE — 36415 COLL VENOUS BLD VENIPUNCTURE: CPT

## 2023-09-10 PROCEDURE — 99232 SBSQ HOSP IP/OBS MODERATE 35: CPT | Mod: GC | Performed by: INTERNAL MEDICINE

## 2023-09-10 PROCEDURE — 80053 COMPREHEN METABOLIC PANEL: CPT

## 2023-09-10 RX ORDER — LORAZEPAM 2 MG/1
4 TABLET ORAL
Status: DISCONTINUED | OUTPATIENT
Start: 2023-09-10 | End: 2023-09-12 | Stop reason: HOSPADM

## 2023-09-10 RX ORDER — FLUTICASONE PROPIONATE 44 UG/1
2 AEROSOL, METERED RESPIRATORY (INHALATION)
Status: DISCONTINUED | OUTPATIENT
Start: 2023-09-10 | End: 2023-09-11

## 2023-09-10 RX ORDER — TAMSULOSIN HYDROCHLORIDE 0.4 MG/1
0.4 CAPSULE ORAL
Status: DISCONTINUED | OUTPATIENT
Start: 2023-09-10 | End: 2023-09-12 | Stop reason: HOSPADM

## 2023-09-10 RX ORDER — LORAZEPAM 2 MG/ML
1 INJECTION INTRAMUSCULAR
Status: DISCONTINUED | OUTPATIENT
Start: 2023-09-10 | End: 2023-09-12 | Stop reason: HOSPADM

## 2023-09-10 RX ORDER — LORAZEPAM 2 MG/ML
2 INJECTION INTRAMUSCULAR
Status: DISCONTINUED | OUTPATIENT
Start: 2023-09-10 | End: 2023-09-12 | Stop reason: HOSPADM

## 2023-09-10 RX ORDER — ACETAMINOPHEN 500 MG
1000 TABLET ORAL EVERY 6 HOURS PRN
Status: DISCONTINUED | OUTPATIENT
Start: 2023-09-15 | End: 2023-09-12 | Stop reason: HOSPADM

## 2023-09-10 RX ORDER — ACETAMINOPHEN 500 MG
1000 TABLET ORAL EVERY 6 HOURS
Status: DISCONTINUED | OUTPATIENT
Start: 2023-09-10 | End: 2023-09-12 | Stop reason: HOSPADM

## 2023-09-10 RX ORDER — LORAZEPAM 2 MG/ML
1.5 INJECTION INTRAMUSCULAR
Status: DISCONTINUED | OUTPATIENT
Start: 2023-09-10 | End: 2023-09-12 | Stop reason: HOSPADM

## 2023-09-10 RX ORDER — LORAZEPAM 2 MG/1
2 TABLET ORAL
Status: DISCONTINUED | OUTPATIENT
Start: 2023-09-10 | End: 2023-09-12 | Stop reason: HOSPADM

## 2023-09-10 RX ORDER — LORAZEPAM 1 MG/1
1 TABLET ORAL EVERY 4 HOURS PRN
Status: DISCONTINUED | OUTPATIENT
Start: 2023-09-10 | End: 2023-09-12 | Stop reason: HOSPADM

## 2023-09-10 RX ORDER — LORAZEPAM 1 MG/1
0.5 TABLET ORAL EVERY 4 HOURS PRN
Status: DISCONTINUED | OUTPATIENT
Start: 2023-09-10 | End: 2023-09-12 | Stop reason: HOSPADM

## 2023-09-10 RX ORDER — LORAZEPAM 2 MG/ML
0.5 INJECTION INTRAMUSCULAR EVERY 4 HOURS PRN
Status: DISCONTINUED | OUTPATIENT
Start: 2023-09-10 | End: 2023-09-12 | Stop reason: HOSPADM

## 2023-09-10 RX ADMIN — ACETAMINOPHEN 1000 MG: 500 TABLET ORAL at 22:05

## 2023-09-10 RX ADMIN — DEXAMETHASONE 6 MG: 6 TABLET ORAL at 05:12

## 2023-09-10 RX ADMIN — POLYMYXIN B SULFATE AND TRIMETHOPRIM 1 DROP: 10000; 1 SOLUTION OPHTHALMIC at 22:05

## 2023-09-10 RX ADMIN — FLUTICASONE PROPIONATE 88 MCG: 44 AEROSOL, METERED RESPIRATORY (INHALATION) at 17:19

## 2023-09-10 RX ADMIN — POLYMYXIN B SULFATE AND TRIMETHOPRIM 1 DROP: 10000; 1 SOLUTION OPHTHALMIC at 17:19

## 2023-09-10 RX ADMIN — ERYTHROMYCIN 1 APPLICATION: 5 OINTMENT OPHTHALMIC at 17:19

## 2023-09-10 RX ADMIN — ACETAMINOPHEN 1000 MG: 500 TABLET ORAL at 09:30

## 2023-09-10 RX ADMIN — POLYMYXIN B SULFATE AND TRIMETHOPRIM 1 DROP: 10000; 1 SOLUTION OPHTHALMIC at 05:12

## 2023-09-10 RX ADMIN — THERA TABS 1 TABLET: TAB at 05:12

## 2023-09-10 RX ADMIN — THIAMINE HYDROCHLORIDE 500 MG: 100 INJECTION, SOLUTION INTRAMUSCULAR; INTRAVENOUS at 05:16

## 2023-09-10 RX ADMIN — ERYTHROMYCIN 1 APPLICATION: 5 OINTMENT OPHTHALMIC at 13:36

## 2023-09-10 RX ADMIN — TAMSULOSIN HYDROCHLORIDE 0.4 MG: 0.4 CAPSULE ORAL at 09:30

## 2023-09-10 RX ADMIN — ERYTHROMYCIN 1 APPLICATION: 5 OINTMENT OPHTHALMIC at 00:31

## 2023-09-10 RX ADMIN — POLYMYXIN B SULFATE AND TRIMETHOPRIM 1 DROP: 10000; 1 SOLUTION OPHTHALMIC at 03:02

## 2023-09-10 RX ADMIN — ERYTHROMYCIN 1 APPLICATION: 5 OINTMENT OPHTHALMIC at 05:12

## 2023-09-10 RX ADMIN — POLYMYXIN B SULFATE AND TRIMETHOPRIM 1 DROP: 10000; 1 SOLUTION OPHTHALMIC at 15:40

## 2023-09-10 RX ADMIN — ACETAMINOPHEN 1000 MG: 500 TABLET ORAL at 15:40

## 2023-09-10 RX ADMIN — POLYMYXIN B SULFATE AND TRIMETHOPRIM 1 DROP: 10000; 1 SOLUTION OPHTHALMIC at 09:30

## 2023-09-10 RX ADMIN — FOLIC ACID 1 MG: 1 TABLET ORAL at 05:12

## 2023-09-10 ASSESSMENT — COGNITIVE AND FUNCTIONAL STATUS - GENERAL
DAILY ACTIVITIY SCORE: 23
SUGGESTED CMS G CODE MODIFIER DAILY ACTIVITY: CI
SUGGESTED CMS G CODE MODIFIER MOBILITY: CJ
WALKING IN HOSPITAL ROOM: A LITTLE
CLIMB 3 TO 5 STEPS WITH RAILING: A LITTLE
MOBILITY SCORE: 22
TOILETING: A LITTLE

## 2023-09-10 ASSESSMENT — PATIENT HEALTH QUESTIONNAIRE - PHQ9
8. MOVING OR SPEAKING SO SLOWLY THAT OTHER PEOPLE COULD HAVE NOTICED. OR THE OPPOSITE, BEING SO FIGETY OR RESTLESS THAT YOU HAVE BEEN MOVING AROUND A LOT MORE THAN USUAL: NOT AT ALL
4. FEELING TIRED OR HAVING LITTLE ENERGY: NOT AT ALL
3. TROUBLE FALLING OR STAYING ASLEEP OR SLEEPING TOO MUCH: SEVERAL DAYS
6. FEELING BAD ABOUT YOURSELF - OR THAT YOU ARE A FAILURE OR HAVE LET YOURSELF OR YOUR FAMILY DOWN: NOT AL ALL
SUM OF ALL RESPONSES TO PHQ QUESTIONS 1-9: 2
2. FEELING DOWN, DEPRESSED, IRRITABLE, OR HOPELESS: SEVERAL DAYS
5. POOR APPETITE OR OVEREATING: NOT AT ALL
1. LITTLE INTEREST OR PLEASURE IN DOING THINGS: NOT AT ALL
9. THOUGHTS THAT YOU WOULD BE BETTER OFF DEAD, OR OF HURTING YOURSELF: NOT AT ALL
SUM OF ALL RESPONSES TO PHQ9 QUESTIONS 1 AND 2: 1
7. TROUBLE CONCENTRATING ON THINGS, SUCH AS READING THE NEWSPAPER OR WATCHING TELEVISION: NOT AT ALL

## 2023-09-10 ASSESSMENT — PAIN DESCRIPTION - PAIN TYPE: TYPE: ACUTE PAIN

## 2023-09-10 ASSESSMENT — ENCOUNTER SYMPTOMS
TREMORS: 1
BLOOD IN STOOL: 0
CONSTIPATION: 0
DIARRHEA: 0
EYE REDNESS: 1
FEVER: 0
SHORTNESS OF BREATH: 0
NAUSEA: 0
CHILLS: 0
MEMORY LOSS: 0
VOMITING: 0

## 2023-09-10 ASSESSMENT — FIBROSIS 4 INDEX: FIB4 SCORE: 2.39

## 2023-09-10 ASSESSMENT — LIFESTYLE VARIABLES: SUBSTANCE_ABUSE: 1

## 2023-09-10 NOTE — PROGRESS NOTES
Nurse concerned called for patient at 1212, regarding patient having increased bleeding from urethra. MD at bedside ordered CBC and US. CBC collected waiting for results, All VSS.

## 2023-09-10 NOTE — PROGRESS NOTES
Pt transported to S161 off telemetry monitoring but on supplemental O2 in care of transport technician. Report given to ISAEL Mcconnell, personal belongings (phone and wallet, medications) sent with Heide KIRKLAND to the receiving unit. Vital signs stable, no signs of distress or pain. Ace in place per MD (Tyrone).

## 2023-09-10 NOTE — PROGRESS NOTES
Wallet and cell phone returned to patient. Open sealed bag in front of patient and handed him his wallet and cell phone.    family member

## 2023-09-10 NOTE — PROGRESS NOTES
Called to patient's at this time, patient is noted standing above a puddle of blood. Patient states he has been up attempting to use the bathroom and started bleeding. RN assisted patient to sit in bathroom and apply pressure to penis. Continuous Large amount of blood noted. RN requested Charge nurse for assistance and notified Provider. Patient denies pain or discomfort. VSS.

## 2023-09-10 NOTE — ASSESSMENT & PLAN NOTE
Suspected due to traumatic Ace insertion/removal  Dried blood on Ace this morning with heavy bleeding per urethreal meatus after Ace removal  Bleeding nearly stopped with compression as of 1pm  No scrotal edema or swelling of the penis that would suggest urethral rupture and hematoma  Hgb stable  - Repeat  exam to r/o scrotal or penile hematoma  - Low threshold for US and Urology consult if unresolved  - Blood thinners held  - Monitor platelets

## 2023-09-10 NOTE — HOSPITAL COURSE
Mr. Drummond is a 58 y.o. male with history of alcohol abuse who presented 9/7/2023 with hallucinations and delirium tremors in the setting of alcohol detox with unknown last drink. Patient was found to be tremulous and tachycardic in ED where he received phonobarbital and diazepam, and IV fluids. He was also found to be COVID positive, and received oxygen supplementation and decardron. CIWA protocol was initiated and patient was seen and evaluated in ICU after high scores. He was transferred to floor where he continued to receive vitamin and electrolyte supplementation, monitored by CIWA, oxygen supplementation and decadron for management of COVID-19 infection. Patient was found with transaminitis, which resolved during inpatient course. Patient had episode of hematuria during placement and during removal of Ace catheter, resolved over time and with compression. He was monitored closely without signs of continued bleeding.

## 2023-09-10 NOTE — PROGRESS NOTES
"[facility] Internal Medicine Daily Progress Note    Date of Service  9/10/2023    [facility] Team: R IM Green Team   Attending: Elliot Fuentes M.d.  Senior Resident: Dr. Hutchison  Intern:  Dr. Choudhary  Contact Number: [phone]    Chief Complaint  Corona Drummond is a 58 y.o. male admitted 9/7/2023 with alcohol detoxification.    Hospital Course  Mr. Drummond is a 58 y.o. male with history of alcohol abuse who presented 9/7/2023 with hallucinations and delirium tremors in the setting of alcohol detox with unknown last drink. Patient was found to be tremulous and tachycardic in ED where he received phonobarbital and diazepam, and IV fluids. He was also found to be COVID positive, and received oxygen supplementation and decardron. CIWA protocol was initiated and patient was seen and evaluated in ICU after high scores. He was transferred to floor where he continued to receive vitamin and electrolyte supplementation, monitored by CIWA, oxygen supplementation and decadron for management of COVID-19 infection. Patient was found with transaminitis, which resolved during inpatient course. Patient had episode of hematuria during placement and during removal of Ace catheter, resolved over time and with compression. He was monitored closely without signs of continued bleeding.     Interval Problem Update  Patient is well-appearing in bed, able to follow conversation and showing insight into his current condition in hospital. He describes some congestion and fatigue but no significant respiratory issues. He thinks his last drink was \"two weeks ago\" but is not sure. He denies any hallucinations, LOC, seizure-like activity or muscle pain overnight. He denies SI or mood changes.     I have discussed this patient's plan of care and discharge plan at IDT rounds today with Case Management, Nursing, Nursing leadership, and other members of the IDT team.    Consultants/Specialty  critical care    Code Status  Full Code    Disposition  The " patient is not medically cleared for discharge to home or a post-acute facility.      I have placed the appropriate orders for post-discharge needs.    Review of Systems  Review of Systems   Constitutional:  Negative for chills and fever.   Eyes:  Positive for redness.   Respiratory:  Negative for shortness of breath.    Cardiovascular:  Negative for chest pain.   Gastrointestinal:  Negative for blood in stool, constipation, diarrhea, nausea and vomiting.   Genitourinary:  Positive for hematuria. Negative for dysuria.   Neurological:  Positive for tremors.   Psychiatric/Behavioral:  Positive for substance abuse. Negative for memory loss.    All other systems reviewed and are negative.       Physical Exam  Temp:  [36.2 °C (97.1 °F)-37.1 °C (98.8 °F)] 37.1 °C (98.7 °F)  Pulse:  [68-93] 77  Resp:  [14-20] 18  BP: (119-168)/(70-97) 131/91  SpO2:  [90 %-96 %] 92 %    Physical Exam  Vitals and nursing note reviewed.   Constitutional:       General: He is not in acute distress.     Appearance: He is not ill-appearing or toxic-appearing.   HENT:      Head: Atraumatic.      Nose: Congestion present.      Mouth/Throat:      Pharynx: No posterior oropharyngeal erythema.   Eyes:      Extraocular Movements: Extraocular movements intact.   Cardiovascular:      Rate and Rhythm: Normal rate and regular rhythm.   Pulmonary:      Effort: Pulmonary effort is normal.      Breath sounds: Normal breath sounds. No stridor. No wheezing or rales.      Comments: 4 liters nasal cannula oxygen  Chest:      Chest wall: No tenderness.   Abdominal:      General: There is distension.      Tenderness: There is no abdominal tenderness. There is no right CVA tenderness or left CVA tenderness.   Genitourinary:     Comments: Dried blood over Ace site in AM. Hematuria on repeat exam without sign of scrotal or penile edema or swelling.  Musculoskeletal:      Right lower leg: No edema.      Left lower leg: No edema.   Skin:     General: Skin is warm.       Comments: Face is red   Neurological:      Mental Status: He is alert.      Motor: No weakness.      Comments: No tremor   Psychiatric:         Mood and Affect: Mood normal.         Behavior: Behavior normal.         Fluids    Intake/Output Summary (Last 24 hours) at 9/10/2023 1444  Last data filed at 9/10/2023 1400  Gross per 24 hour   Intake 2000 ml   Output 1700 ml   Net 300 ml       Laboratory  Recent Labs     09/09/23  0910 09/10/23  0723 09/10/23  1232   WBC 6.6 7.1 8.5   RBC 4.96 4.92 5.28   HEMOGLOBIN 16.9 16.8 17.4   HEMATOCRIT 50.0 50.1 52.5*   .8* 101.8* 99.4*   MCH 34.1* 34.1* 33.0   MCHC 33.8 33.5 33.1   RDW 50.4* 50.5* 49.3   PLATELETCT 79* 91* 114*   MPV 10.3 10.5 10.8     Recent Labs     09/08/23  0643 09/09/23  0910 09/10/23  0723   SODIUM 134* 133* 134*   POTASSIUM 3.5* 4.4 3.6   CHLORIDE 97 100 100   CO2 19* 20 24   GLUCOSE 105* 110* 100*   BUN 18 17 19   CREATININE 1.01 0.64 0.73   CALCIUM 9.3 8.7 8.7     Recent Labs     09/07/23 2050   APTT 24.9   INR 0.97               Imaging  DX-CHEST-PORTABLE (1 VIEW)   Final Result         1.  No acute cardiopulmonary disease.      US-VASCULAR PENILE COMP    (Results Pending)        Assessment/Plan  Problem Representation:    * Alcohol dependence with withdrawal delirium (HCC)- (present on admission)  Assessment & Plan  Patient and family report different dates of last drink  With initial admit to the floor though required transfer to the ICU for IV phenobarbital, now transferred back to floor  S/p IV detox bag as well as mag, phos, K  - Continue [facility] protocol  - Thiamine, Folate and Multivitamin repletion  - Case management for outpatient detox options.     Hematuria  Assessment & Plan  Suspected due to traumatic Ace insertion/removal  Dried blood on Ace this morning with heavy bleeding per urethreal meatus after Ace removal  Bleeding nearly stopped with compression as of 1pm  No scrotal edema or swelling of the penis that would suggest  urethral rupture and hematoma  Hgb stable  - Repeat  exam to r/o scrotal or penile hematoma  - Low threshold for US and Urology consult if unresolved  - Blood thinners held  - Monitor platelets    JARVIS (obstructive sleep apnea)- (present on admission)  Assessment & Plan  Diagnosed 10 years ago but he was intolerant to CPAP and hasn't used it.    SARS-CoV-2 positive- (present on admission)  Assessment & Plan  + test  Lungs clear on exam, general fatigue on interview  Chest xray is negative  - Continue Decadron  - Isolation precautions.     Hypomagnesemia- (present on admission)  Assessment & Plan  Replacement was given    Thrombocytopenia (HCC)- (present on admission)  Assessment & Plan  Likely due to bone marrow suppression secondary to alcohol  High MCV  - TSH, B12 and fibrinogen pending    Conjunctivitis of both eyes  Assessment & Plan  - Continue Polytrim for bilateral conjunctivitis    Alcoholic hepatitis  Assessment & Plan  Patient with transaminitis likely secondary to alcoholic hepatitis, now resolved      Hyponatremia  Assessment & Plan  Secondary to dedydration  s/p normal saline    Hypokalemia  Assessment & Plan  Patient presents with potassium level 3.3  Repletion with IV and p.o. potassium  resolved         VTE prophylaxis: SCDs/TEDs    I have performed a physical exam and reviewed and updated ROS and Plan today (9/10/2023). In review of yesterday's note (9/9/2023), there are no changes except as documented above.

## 2023-09-10 NOTE — PROGRESS NOTES
Urinary catheter removed per order. Blood clots present upon removal. Patient went to restroom after and urinated successfully.

## 2023-09-10 NOTE — NON-PROVIDER
[facility] INTERNAL MEDICINE PROGRESS NOTE     Attending: Elliot Fuentes MD    Resident: Nicky Hutchison MD    PATIENT: Corona Drummond; [id number]; 1965    ID: 58 y.o. male with alcohol use disorder and hypoxia from COVID-19 was admitted on 9/7/23 for alcohol dependence with withdrawal delirium.     SUBJECTIVE: No acute events overnight other than he has been bleeding from his penis after the batres catheter was placed, most likely from traumatic insertion. Patient denies any difficulty urinating or painful urination. He did have a couple of bowel movements yesterday which he describes as loose and dark brown. He denies melena appearing stools. Patient states that he is not having anymore hallucinations and no tremors. He states that his last drink was two weeks ago when he started coming down with COVID-19. Patient claims that his alcohol use typically consists of two bourbons per night with an occasional cigar. 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 Patient was in the hospital about a year and a half ago for this same thing but he states that what had brought him into the hospital was a fall that he had. Last time patient saw a physician was many years ago, so he is unsure if he has any other past medical history. Patient denies any suicidal ideation.     OBJECTIVE:  Temp:  [36.2 °C (97.1 °F)-36.6 °C (97.8 °F)] 36.2 °C (97.1 °F)  Pulse:  [68-93] 80  Resp:  [14-24] 17  BP: (119-144)/(70-94) 126/87  SpO2:  [90 %-96 %] 95 %      Intake/Output Summary (Last 24 hours) at 9/10/2023 0823  Last data filed at 9/10/2023 0400  Gross per 24 hour   Intake 1605 ml   Output 1700 ml   Net -95 ml       PE:   General: No acute distress, resting comfortably in bed.  HEENT: Nasal congestion, hoarse voice.   Cardiovascular: RRR, no m/r/g, normal  S1/S2  Respiratory: Symmetric inspiratory effort. CTAB with no adventitious sounds  Abdomen: Epigastric tenderness.   EXT:  KHANNA, 2+ radial pulses, no lower extremity edema bilaterally  Neuro: Non focal, alert and oriented    LABS:  Recent Labs     09/07/23 2050 09/08/23 0446 09/09/23  0910 09/10/23  0723   WBC 8.0 6.4 6.6 7.1   RBC 5.85 5.69 4.96 4.92   HEMOGLOBIN 19.7* 18.8* 16.9 16.8   HEMATOCRIT 57.9* 57.8* 50.0 50.1   MCV 99.0* 101.6* 100.8* 101.8*   MCH 33.7* 33.0 34.1* 34.1*   RDW 51.5* 52.9* 50.4* 50.5*   PLATELETCT 90* 85* 79* 91*   MPV 10.8 10.2 10.3 10.5   NEUTSPOLYS 75.50*  --  84.10* 72.40*   LYMPHOCYTES 6.60*  --  6.80* 12.90*   MONOCYTES 16.60*  --  8.10 12.60   EOSINOPHILS 0.10  --  0.50 1.40   BASOPHILS 0.60  --  0.20 0.30     Recent Labs     09/08/23  0643 09/09/23  0910 09/10/23  0723   SODIUM 134* 133* 134*   POTASSIUM 3.5* 4.4 3.6   CHLORIDE 97 100 100   CO2 19* 20 24   BUN 18 17 19   CREATININE 1.01 0.64 0.73   CALCIUM 9.3 8.7 8.7   MAGNESIUM 1.9 2.0 2.0   PHOSPHORUS 2.6 2.6 2.9   ALBUMIN 4.4 3.3 3.4     Estimated GFR/CRCL = Estimated Creatinine Clearance: 148.3 mL/min (by C-G formula based on SCr of 0.73 mg/dL).  Recent Labs     09/08/23  0643 09/09/23  0910 09/10/23  0723   GLUCOSE 105* 110* 100*     Recent Labs     09/07/23  2050 09/08/23  0643 09/09/23  0910 09/10/23  0723   ASTSGOT 142* 108* 46* 31   ALTSGPT 97* 83* 51* 45   TBILIRUBIN 1.0 1.0 0.7 0.6   ALKPHOSPHAT 100* 86 72 72   GLOBULIN 3.8* 3.1 2.6 2.7   INR 0.97  --   --   --    AMMONIA 32  --   --   --              Recent Labs     09/07/23  2050 09/10/23  0723   INR 0.97  --    APTT 24.9  --    FIBRINOGEN  --  419       MICROBIOLOGY:  Results       Procedure Component Value Units Date/Time    CoV-2, Flu A/B, And RSV by PCR ([employer]) [[id number]]  (Abnormal) Collected: 09/08/23 0920    Order Status: Completed Specimen: Respirate from Nasopharyngeal Updated: 09/08/23 1219     Influenza virus A RNA Negative     Influenza virus B, PCR  Negative     RSV, PCR Negative     SARS-CoV-2 by PCR DETECTED     Comment: **The [employer] GeneXpert Xpress SARS-CoV-2 RT-PCR Test has been made  available for use under the Emergency Use Authorization (EUA) only.          SARS-CoV-2 Source NP Swab    Narrative:      Release to patient->Immediate    URINALYSIS (UA) [[id number]]     Order Status: Sent Specimen: Urine, Clean Catch             IMAGING:  DX-CHEST-PORTABLE (1 VIEW)   Final Result         1.  No acute cardiopulmonary disease.          MEDS:  Current Facility-Administered Medications   Medication Last Admin    tamsulosin (Flomax) capsule 0.4 mg      dexamethasone (Decadron) tablet 6 mg 6 mg at 09/10/23 0512    rivaroxaban (Xarelto) tablet 10 mg 10 mg at 09/09/23 1812    LORazepam (Ativan) tablet 0.5 mg      LORazepam (Ativan) tablet 1 mg      Or    LORazepam (Ativan) injection 0.5 mg      LORazepam (Ativan) tablet 2 mg      Or    LORazepam (Ativan) injection 1 mg      LORazepam (Ativan) tablet 3 mg      Or    LORazepam (Ativan) injection 1.5 mg      LORazepam (Ativan) tablet 4 mg      Or    LORazepam (Ativan) injection 2 mg      [START ON 9/11/2023] thiamine (Vitamin B-1) tablet 100 mg      erythromycin ophthalmic ointment 1 Application 1 Application at 09/10/23 0512    senna-docusate (Pericolace Or Senokot S) 8.6-50 MG per tablet 2 Tablet 2 Tablet at 09/09/23 1812    And    polyethylene glycol/lytes (Miralax) PACKET 1 Packet      And    magnesium hydroxide (Milk Of Magnesia) suspension 30 mL      And    bisacodyl (Dulcolax) suppository 10 mg      acetaminophen (Tylenol) tablet 650 mg      ondansetron (Zofran) syringe/vial injection 4 mg      ondansetron (Zofran ODT) dispertab 4 mg      promethazine (Phenergan) tablet 12.5-25 mg      promethazine (Phenergan) suppository 12.5-25 mg      prochlorperazine (Compazine) injection 5-10 mg      folic acid (Folvite) tablet 1 mg 1 mg at 09/10/23 0512    multivitamin tablet 1 Tablet 1 Tablet at 09/10/23 0512     polymixin-trimethoprim (Polytrim) 21861-1.1 UNIT/ML-% ophthalmic solution 1 Drop 1 Drop at 09/10/23 0512       PROBLEM LIST:  No problems updated.    ASSESSMENT/PLAN: 58 y.o. male with alcohol use disorder and hypoxia from COVID-19 was admitted on 9/7/23 for alcohol dependence with withdrawal delirium.     Alcohol dependence with withdrawal delirium  -Patient treated in the ED with phenobarbital, Lorazepam, and diazepam as well as fluids, thiamine, folic acid, and magnesium supplement. CIWA 27. He was moved to the ICU as he remained agitated after medication administration and was given IV phenobarbital as well as high dose IV thiamine.   -9/10/23 patient is without hallucinations, agitation, or tremors. Current medications include 1mg folic acid supplement, multivitamin, and thiamine tablet.   -Patient also taking Lorazepam.   COVID-19  -Patient states that he started showing symptoms around 10 days ago but tested himself at home on 9/8/23. Patient denies any shortness of breath but still has some nasal congestion. He is saturating at 94% 2 L NC O2.   -Currently taking dexamethasone 6mg tab.   JARVIS  -using nocturnal CPAP/BIPAP  Hypomagnesemia  -Current 2.0.  Thrombocytopenia  -Patient's current platelets are 91 and INR 0.97. He is bleeding from his penis after most likely traumatic batres insertion. He is also taking Xarelto 10mg. No history of bleeding disorders.   -Patient will do Trial of Void with new Tamsulosin.  -Working patient up for macrocytic anemia: immature platelet fraction, fibrinogen, TSH all within normal limits. Vitamin B12 1458.   Conjunctivitis   -Patient is taking Polytrim and erythromycin ointment and says that his eyes do feel better.   Alcohol hepatitis  -Patient does complain of epigastric pain but thinks it is just from coughing.   -His transaminases have come down from AST 46 to 31 and ALT 51 to 45.   Hyponatremia  -Sodium remains low at 134.   Hypokalemia   -Current 3.6.    #Disposition:  Patient is not medically cleared for discharge.     Core Measures:  Fluids: none  Lines: Zofran  Abx: Erythromycin ophthalmic ointment (conjunctivitis)  Diet: reular   PPX: Xarelto    CODE STATUS: FULL    ANASTACIO Abbott-S2  [employer] [employer]

## 2023-09-10 NOTE — CARE PLAN
The patient is Stable - Low risk of patient condition declining or worsening    Shift Goals  Clinical Goals: Stable vital signs, Monitor O2, Strict Is and Os  Patient Goals: Rest  Family Goals: BRENDA    Progress made toward(s) clinical / shift goals:      Problem: Knowledge Deficit - Standard  Goal: Patient and family/care givers will demonstrate understanding of plan of care, disease process/condition, diagnostic tests and medications  Description: Target End Date:  1-3 days or as soon as patient condition allows    Document in Patient Education    1.  Patient and family/caregiver oriented to unit, equipment, visitation policy and means for communicating concern  2.  Complete/review Learning Assessment  3.  Assess knowledge level of disease process/condition, treatment plan, diagnostic tests and medications  4.  Explain disease process/condition, treatment plan, diagnostic tests and medications  Outcome: Progressing  Note: Patient updated regarding his plan of care. Educated patient on the importance of keeping his O2 on and monitoring his intake and output. Patient states that he is not in any pain. Patient uses call light to notify staff that he needs to use the restroom for a bowel movement. Patient's belongings returned to patient. Called RICU to see if belongings were still there. UC went to  belongings. O2 remained within established parameters on continuous pulse ox.     Problem: Skin Integrity  Goal: Skin integrity is maintained or improved  Description: Target End Date:  Prior to discharge or change in level of care    Document interventions on Skin Risk/Gera flowsheet groups and corresponding LDA    1.  Assess and monitor skin integrity, appearance and/or temperature  2.  Assess risk factors for impaired skin integrity and/or pressures ulcers  3.  Implement precautions to protect skin integrity in collaboration with interdisciplinary team  4.  Implement pressure ulcer prevention protocol if at risk  for skin breakdown  5.  Confirm wound care consult if at risk for skin breakdown  6.  Ensure patient use of pressure relieving devices  (Low air loss bed, waffle overlay, heel protectors, ROHO cushion, etc)  9/10/2023 0405 by Sierra Muro R.N.  Outcome: Progressing  Note: Waffle overlay placed on bed. Patient has some redness on his sacrum, groin and under breast. Patient is able to turn self from side to side. Patient is ambulatory. Patient is bleeding bright red blood from batres. Patient's catheter cleaned multiple times with soap and water.

## 2023-09-10 NOTE — PROGRESS NOTES
RN assessed patient's penis for bleeding, small amount noted at this time, there is no swelling noted from scrotum or felt upon palpation. Patient declines pain or discomfort at this time.

## 2023-09-10 NOTE — CARE PLAN
-- Patient:  -- MRN: [id number]  -- Aidin Request ID: [id number]  -- Level of care reserved: Inpatient [facility]  -- Partner Reserved: [address] [place] [address], [address] [place] [address] Loop [phone]  -- Clinical needs requested:  -- Geography searched: 40 miles around [phone]  -- Start of Service:  -- Request sent: 2:12pm EDT on 8/16/2023 by Maria Guadalupe Bills  -- Partner reserved: 1:23pm EDT on 8/17/2023 by Maria Guadalupe Bills  -- Choice list shared: 1:01pm EDT on 8/17/2023 by Maria Guadalupe Bills The patient is Stable - Low risk of patient condition declining or worsening    Shift Goals  Clinical Goals: stable vital signs, pulmonary hygiene, eye care  Patient Goals: rest  Family Goals: kolby    Progress made toward(s) clinical / shift goals:    Problem: Knowledge Deficit - Standard  Goal: Patient and family/care givers will demonstrate understanding of plan of care, disease process/condition, diagnostic tests and medications  Description: Target End Date:  1-3 days or as soon as patient condition allows    Document in Patient Education    1.  Patient and family/caregiver oriented to unit, equipment, visitation policy and means for communicating concern  2.  Complete/review Learning Assessment  3.  Assess knowledge level of disease process/condition, treatment plan, diagnostic tests and medications  4.  Explain disease process/condition, treatment plan, diagnostic tests and medications  Outcome: Progressing     Problem: Optimal Care for Alcohol Withdrawal  Goal: Optimal Care for the alcohol withdrawal patient  Description: Target End Date:  1 to 3 days    1.  Alcohol history screening done on admission  2.  CIWA-AR score assessment (includes assessment of nausea/vomiting, tremor, sweats, anxiety, agitation, tactile, visual and auditory disturbances, headache and orientation/sensorium).  Document on CIWA flowsheet.  3.  Follow CIWA-AR score protocol  4.  Frequent reorientation  5.  Monitor for fluid and electrolyte imbalance.  6.  Assess for respiratory depression due to sedation (pulse ox)  7.  Consider thiamine, multivitamins, folic acid and magnesium sulfate per provider order  8.  Collaborate with Social Workers/Case Management  9.  Collaborate with mental health  Outcome: Progressing     Problem: Psychosocial  Goal: Patient's level of anxiety will decrease  Description: Target End Date:  1-3 days or as soon as patient condition allows    1.  Collaborate with patient and family/caregiver to identify triggers  and develop strategies to cope with anxiety  2.  Implement stimuli reduction, calming techniques  3.  Pharmacologic management per provider order  4.  Encourage patient/family/care giver participation  5.  Collaborate with interdisciplinary team including Psychologist or Behavioral Health Team as needed  Outcome: Progressing     Problem: Risk for Aspiration  Goal: Patient's risk for aspiration will be absent or decrease  Description: Target End Date:  Prior to discharge or change in level of care    1.   Complete dysphagia screening on admission  2.   NPO until dysphagia screening complete or medically cleared  3.   Collaborate with Speech Therapy, Clinical Dietitian and interdisciplinary team  4.   Implement aspiration precautions  5.   Assist patient up to chair for meals  6.   Elevate head of bed 90 degrees if patient is unable to get out of bed  7.   Encourage small bites  8.   Ensure foods/liquids are of appropriate consistency  9.   Assess for any signs/symptoms of aspiration  10. Assess breath sounds and vital signs after oral intake  Outcome: Progressing     Problem: Skin Integrity  Goal: Skin integrity is maintained or improved  Description: Target End Date:  Prior to discharge or change in level of care    Document interventions on Skin Risk/Gera flowsheet groups and corresponding LDA    1.  Assess and monitor skin integrity, appearance and/or temperature  2.  Assess risk factors for impaired skin integrity and/or pressures ulcers  3.  Implement precautions to protect skin integrity in collaboration with interdisciplinary team  4.  Implement pressure ulcer prevention protocol if at risk for skin breakdown  5.  Confirm wound care consult if at risk for skin breakdown  6.  Ensure patient use of pressure relieving devices  (Low air loss bed, waffle overlay, heel protectors, ROHO cushion, etc)  Outcome: Progressing     Problem: Fall Risk  Goal: Patient will remain free from falls  Description: Target End  Date:  Prior to discharge or change in level of care    Document interventions on the Beronica Min Fall Risk Assessment    1.  Assess for fall risk factors  2.  Implement fall precautions  Outcome: Progressing     Problem: Safety - Medical Restraint  Goal: Free from restraint(s) (Restraint for Interference with Medical Device)  Description: INTERVENTIONS:  1.  ONCE/SHIFT or MINIMUM Q12H: Assess and document the continuing need for restraints  2.  Q24H: Continued use of restraint requires LIP to perform face to face examination and written order  3.  Identify and implement measures to help patient regain control  4.  Educate patient/family on discontinuation criteria   5.  Assess patient's understanding and retention of education provided  6.  Assess readiness for release & initiate progressive release per protocol  7.  Identify and document criteria for restraints  Outcome: Progressing

## 2023-09-10 NOTE — PROGRESS NOTES
..4 Eyes Skin Assessment Completed by Sierra RN and ISAEL Betancur.    Head WDL  Ears WDL  Nose WDL  Mouth WDL  Neck WDL  Breast/Chest Redness  Shoulder Blades WDL  Spine WDL  (R) Arm/Elbow/Hand WDL  (L) Arm/Elbow/Hand WDL  Abdomen WDL  Groin Redness  Scrotum/Coccyx/Buttocks Redness and Blanching  (R) Leg Scattered Scrapes   (L) Leg Scattered Scrapes   (R) Heel/Foot/Toe WDL  (L) Heel/Foot/Toe WDL          Devices In Places Tele Box, Blood Pressure Cuff, Pulse Ox, Ace, and Nasal Cannula      Interventions In Place Gray Ear Foams, Waffle Overlay, and Pillows    Possible Skin Injury No    Pictures Uploaded Into Epic N/A  Wound Consult Placed N/A  RN Wound Prevention Protocol Ordered No

## 2023-09-11 LAB
ALBUMIN SERPL BCP-MCNC: 3.4 G/DL (ref 3.2–4.9)
ALBUMIN/GLOB SERPL: 1.3 G/DL
ALP SERPL-CCNC: 64 U/L (ref 30–99)
ALT SERPL-CCNC: 36 U/L (ref 2–50)
ANION GAP SERPL CALC-SCNC: 10 MMOL/L (ref 7–16)
AST SERPL-CCNC: 22 U/L (ref 12–45)
BASOPHILS # BLD AUTO: 0.3 % (ref 0–1.8)
BASOPHILS # BLD: 0.02 K/UL (ref 0–0.12)
BILIRUB SERPL-MCNC: 0.6 MG/DL (ref 0.1–1.5)
BUN SERPL-MCNC: 17 MG/DL (ref 8–22)
CALCIUM ALBUM COR SERPL-MCNC: 9.2 MG/DL (ref 8.5–10.5)
CALCIUM SERPL-MCNC: 8.7 MG/DL (ref 8.5–10.5)
CHLORIDE SERPL-SCNC: 103 MMOL/L (ref 96–112)
CO2 SERPL-SCNC: 24 MMOL/L (ref 20–33)
CREAT SERPL-MCNC: 0.83 MG/DL (ref 0.5–1.4)
EOSINOPHIL # BLD AUTO: 0.08 K/UL (ref 0–0.51)
EOSINOPHIL NFR BLD: 1.3 % (ref 0–6.9)
ERYTHROCYTE [DISTWIDTH] IN BLOOD BY AUTOMATED COUNT: 49.3 FL (ref 35.9–50)
GFR SERPLBLD CREATININE-BSD FMLA CKD-EPI: 101 ML/MIN/1.73 M 2
GLOBULIN SER CALC-MCNC: 2.6 G/DL (ref 1.9–3.5)
GLUCOSE SERPL-MCNC: 86 MG/DL (ref 65–99)
HCT VFR BLD AUTO: 46.4 % (ref 42–52)
HGB BLD-MCNC: 15.5 G/DL (ref 14–18)
IMM GRANULOCYTES # BLD AUTO: 0.04 K/UL (ref 0–0.11)
IMM GRANULOCYTES NFR BLD AUTO: 0.7 % (ref 0–0.9)
LYMPHOCYTES # BLD AUTO: 1.4 K/UL (ref 1–4.8)
LYMPHOCYTES NFR BLD: 23.1 % (ref 22–41)
MAGNESIUM SERPL-MCNC: 2 MG/DL (ref 1.5–2.5)
MCH RBC QN AUTO: 33.7 PG (ref 27–33)
MCHC RBC AUTO-ENTMCNC: 33.4 G/DL (ref 32.3–36.5)
MCV RBC AUTO: 100.9 FL (ref 81.4–97.8)
MONOCYTES # BLD AUTO: 1.05 K/UL (ref 0–0.85)
MONOCYTES NFR BLD AUTO: 17.4 % (ref 0–13.4)
NEUTROPHILS # BLD AUTO: 3.46 K/UL (ref 1.82–7.42)
NEUTROPHILS NFR BLD: 57.2 % (ref 44–72)
NRBC # BLD AUTO: 0 K/UL
NRBC BLD-RTO: 0 /100 WBC (ref 0–0.2)
PHOSPHATE SERPL-MCNC: 3 MG/DL (ref 2.5–4.5)
PLATELET # BLD AUTO: 97 K/UL (ref 164–446)
PLATELETS.RETICULATED NFR BLD AUTO: 7.6 % (ref 0.6–13.1)
PMV BLD AUTO: 11.1 FL (ref 9–12.9)
POTASSIUM SERPL-SCNC: 3.3 MMOL/L (ref 3.6–5.5)
PROT SERPL-MCNC: 6 G/DL (ref 6–8.2)
RBC # BLD AUTO: 4.6 M/UL (ref 4.7–6.1)
SODIUM SERPL-SCNC: 137 MMOL/L (ref 135–145)
WBC # BLD AUTO: 6.1 K/UL (ref 4.8–10.8)

## 2023-09-11 PROCEDURE — A9270 NON-COVERED ITEM OR SERVICE: HCPCS | Performed by: STUDENT IN AN ORGANIZED HEALTH CARE EDUCATION/TRAINING PROGRAM

## 2023-09-11 PROCEDURE — 700102 HCHG RX REV CODE 250 W/ 637 OVERRIDE(OP)

## 2023-09-11 PROCEDURE — 85055 RETICULATED PLATELET ASSAY: CPT

## 2023-09-11 PROCEDURE — A9270 NON-COVERED ITEM OR SERVICE: HCPCS | Performed by: HOSPITALIST

## 2023-09-11 PROCEDURE — 85025 COMPLETE CBC W/AUTO DIFF WBC: CPT

## 2023-09-11 PROCEDURE — 770020 HCHG ROOM/CARE - TELE (206)

## 2023-09-11 PROCEDURE — 84100 ASSAY OF PHOSPHORUS: CPT

## 2023-09-11 PROCEDURE — 80053 COMPREHEN METABOLIC PANEL: CPT

## 2023-09-11 PROCEDURE — 700102 HCHG RX REV CODE 250 W/ 637 OVERRIDE(OP): Performed by: INTERNAL MEDICINE

## 2023-09-11 PROCEDURE — 99232 SBSQ HOSP IP/OBS MODERATE 35: CPT | Mod: GC | Performed by: INTERNAL MEDICINE

## 2023-09-11 PROCEDURE — 83735 ASSAY OF MAGNESIUM: CPT

## 2023-09-11 PROCEDURE — 700102 HCHG RX REV CODE 250 W/ 637 OVERRIDE(OP): Performed by: HOSPITALIST

## 2023-09-11 PROCEDURE — 700102 HCHG RX REV CODE 250 W/ 637 OVERRIDE(OP): Performed by: STUDENT IN AN ORGANIZED HEALTH CARE EDUCATION/TRAINING PROGRAM

## 2023-09-11 PROCEDURE — 51798 US URINE CAPACITY MEASURE: CPT

## 2023-09-11 PROCEDURE — A9270 NON-COVERED ITEM OR SERVICE: HCPCS

## 2023-09-11 PROCEDURE — A9270 NON-COVERED ITEM OR SERVICE: HCPCS | Performed by: INTERNAL MEDICINE

## 2023-09-11 RX ORDER — FLUTICASONE PROPIONATE 44 UG/1
2 AEROSOL, METERED RESPIRATORY (INHALATION) EVERY 12 HOURS
Status: DISCONTINUED | OUTPATIENT
Start: 2023-09-11 | End: 2023-09-12 | Stop reason: HOSPADM

## 2023-09-11 RX ORDER — POTASSIUM CHLORIDE 20 MEQ/1
40 TABLET, EXTENDED RELEASE ORAL ONCE
Status: COMPLETED | OUTPATIENT
Start: 2023-09-11 | End: 2023-09-11

## 2023-09-11 RX ADMIN — FOLIC ACID 1 MG: 1 TABLET ORAL at 05:18

## 2023-09-11 RX ADMIN — SENNOSIDES AND DOCUSATE SODIUM 2 TABLET: 50; 8.6 TABLET ORAL at 18:09

## 2023-09-11 RX ADMIN — ACETAMINOPHEN 1000 MG: 500 TABLET ORAL at 05:18

## 2023-09-11 RX ADMIN — ACETAMINOPHEN 1000 MG: 500 TABLET ORAL at 12:52

## 2023-09-11 RX ADMIN — DEXAMETHASONE 6 MG: 6 TABLET ORAL at 05:18

## 2023-09-11 RX ADMIN — Medication 100 MG: at 05:21

## 2023-09-11 RX ADMIN — POTASSIUM CHLORIDE 40 MEQ: 1500 TABLET, EXTENDED RELEASE ORAL at 08:01

## 2023-09-11 RX ADMIN — POLYMYXIN B SULFATE AND TRIMETHOPRIM 1 DROP: 10000; 1 SOLUTION OPHTHALMIC at 23:07

## 2023-09-11 RX ADMIN — ERYTHROMYCIN 1 APPLICATION: 5 OINTMENT OPHTHALMIC at 12:52

## 2023-09-11 RX ADMIN — ERYTHROMYCIN 1 APPLICATION: 5 OINTMENT OPHTHALMIC at 05:19

## 2023-09-11 RX ADMIN — TAMSULOSIN HYDROCHLORIDE 0.4 MG: 0.4 CAPSULE ORAL at 08:01

## 2023-09-11 RX ADMIN — FLUTICASONE PROPIONATE 88 MCG: 44 AEROSOL, METERED RESPIRATORY (INHALATION) at 18:10

## 2023-09-11 RX ADMIN — ERYTHROMYCIN 1 APPLICATION: 5 OINTMENT OPHTHALMIC at 18:10

## 2023-09-11 RX ADMIN — THERA TABS 1 TABLET: TAB at 05:18

## 2023-09-11 RX ADMIN — POLYMYXIN B SULFATE AND TRIMETHOPRIM 1 DROP: 10000; 1 SOLUTION OPHTHALMIC at 11:03

## 2023-09-11 RX ADMIN — ERYTHROMYCIN 1 APPLICATION: 5 OINTMENT OPHTHALMIC at 01:13

## 2023-09-11 RX ADMIN — FLUTICASONE PROPIONATE 88 MCG: 44 AEROSOL, METERED RESPIRATORY (INHALATION) at 08:37

## 2023-09-11 RX ADMIN — ACETAMINOPHEN 1000 MG: 500 TABLET ORAL at 18:09

## 2023-09-11 RX ADMIN — POLYMYXIN B SULFATE AND TRIMETHOPRIM 1 DROP: 10000; 1 SOLUTION OPHTHALMIC at 05:19

## 2023-09-11 RX ADMIN — SENNOSIDES AND DOCUSATE SODIUM 2 TABLET: 50; 8.6 TABLET ORAL at 05:18

## 2023-09-11 RX ADMIN — POLYMYXIN B SULFATE AND TRIMETHOPRIM 1 DROP: 10000; 1 SOLUTION OPHTHALMIC at 18:10

## 2023-09-11 RX ADMIN — POLYMYXIN B SULFATE AND TRIMETHOPRIM 1 DROP: 10000; 1 SOLUTION OPHTHALMIC at 01:13

## 2023-09-11 ASSESSMENT — ENCOUNTER SYMPTOMS
NAUSEA: 0
VOMITING: 0
FLANK PAIN: 0
EYE REDNESS: 1
CONSTIPATION: 0
SHORTNESS OF BREATH: 0
DIARRHEA: 0
TREMORS: 0
MYALGIAS: 0
FEVER: 0
CHILLS: 0
BLOOD IN STOOL: 0
MEMORY LOSS: 0

## 2023-09-11 ASSESSMENT — PATIENT HEALTH QUESTIONNAIRE - PHQ9
SUM OF ALL RESPONSES TO PHQ9 QUESTIONS 1 AND 2: 0
2. FEELING DOWN, DEPRESSED, IRRITABLE, OR HOPELESS: NOT AT ALL
1. LITTLE INTEREST OR PLEASURE IN DOING THINGS: NOT AT ALL

## 2023-09-11 ASSESSMENT — LIFESTYLE VARIABLES: SUBSTANCE_ABUSE: 0

## 2023-09-11 ASSESSMENT — FIBROSIS 4 INDEX: FIB4 SCORE: 2.39

## 2023-09-11 ASSESSMENT — PAIN DESCRIPTION - PAIN TYPE: TYPE: ACUTE PAIN

## 2023-09-11 NOTE — NON-PROVIDER
[facility] INTERNAL MEDICINE PROGRESS NOTE       Attending: Elliot Fuentes MD    Resident: Ashtyn Manley DO    PATIENT: Corona Drummond; [id number]; 1965    ID: 58 y.o. male with alcohol use disorder and hypoxia from COVID-19 was admitted on 9/7/23 for alcohol dependence with withdrawal delirium.     SUBJECTIVE: No acute events overnight. Patient states that he has had a little bit of blood coming out of the penile meatus but it is very minimal. He denies any penile or scrotal swelling or pain. Patient denies tremors and hallucinations. He is still congested but overall doing well. He did not have a bowel movement yesterday but states that he did take a stool softener.     OBJECTIVE:  Temp:  [36.2 °C (97.1 °F)-37.6 °C (99.6 °F)] 37.6 °C (99.6 °F)  Pulse:  [69-90] 76  Resp:  [17-18] 18  BP: (124-168)/(76-97) 131/89  SpO2:  [92 %-97 %] 95 %      Intake/Output Summary (Last 24 hours) at 9/11/2023 0622  Last data filed at 9/11/2023 0000  Gross per 24 hour   Intake 1110 ml   Output 600 ml   Net 510 ml       PE:   General: No acute distress, resting comfortably in bed.  HEENT: NC/AT. EOMI. MMM  Cardiovascular: RRR, no m/r/g, normal S1/S2  Respiratory: Symmetric inspiratory effort. CTAB with no adventitious sounds  Abdomen: BS+, soft, NT/ND   : Bleeding from penile meatus, most likely due to traumatic batres insertion. No suprapubic tenderness.   EXT:  KHANNA, 2+ radial pulses, no lower extremity edema bilaterally  Neuro: Non focal, alert and oriented    LABS:  Recent Labs     09/09/23  0910 09/10/23  0723 09/10/23  1232 09/11/23  0448   WBC 6.6 7.1 8.5 6.1   RBC 4.96 4.92 5.28 4.60*   HEMOGLOBIN 16.9 16.8 17.4 15.5   HEMATOCRIT 50.0 50.1 52.5* 46.4   .8* 101.8* 99.4* 100.9*   MCH 34.1* 34.1* 33.0 33.7*   RDW 50.4* 50.5* 49.3 49.3   PLATELETCT 79* 91* 114* 97*   MPV 10.3 10.5 10.8 11.1   NEUTSPOLYS 84.10* 72.40*  --  57.20   LYMPHOCYTES 6.80* 12.90*  --  23.10   MONOCYTES 8.10 12.60  --  17.40*   EOSINOPHILS 0.50 1.40   --  1.30   BASOPHILS 0.20 0.30  --  0.30     Recent Labs     09/09/23  0910 09/10/23  0723 09/11/23  0448   SODIUM 133* 134* 137   POTASSIUM 4.4 3.6 3.3*   CHLORIDE 100 100 103   CO2 20 24 24   BUN 17 19 17   CREATININE 0.64 0.73 0.83   CALCIUM 8.7 8.7 8.7   MAGNESIUM 2.0 2.0 2.0   PHOSPHORUS 2.6 2.9 3.0   ALBUMIN 3.3 3.4 3.4     Estimated GFR/CRCL = Estimated Creatinine Clearance: 131.5 mL/min (by C-G formula based on SCr of 0.83 mg/dL).  Recent Labs     09/09/23  0910 09/10/23  0723 09/11/23  0448   GLUCOSE 110* 100* 86     Recent Labs     09/09/23  0910 09/10/23  0723 09/11/23  0448   ASTSGOT 46* 31 22   ALTSGPT 51* 45 36   TBILIRUBIN 0.7 0.6 0.6   ALKPHOSPHAT 72 72 64   GLOBULIN 2.6 2.7 2.6             Recent Labs     09/10/23  0723   FIBRINOGEN 419       MICROBIOLOGY:  Results       Procedure Component Value Units Date/Time    CoV-2, Flu A/B, And RSV by PCR ([employer]) [[id number]]  (Abnormal) Collected: 09/08/23 0920    Order Status: Completed Specimen: Respirate from Nasopharyngeal Updated: 09/08/23 1219     Influenza virus A RNA Negative     Influenza virus B, PCR Negative     RSV, PCR Negative     SARS-CoV-2 by PCR DETECTED     Comment: **The [employer] GeneXpert Xpress SARS-CoV-2 RT-PCR Test has been made  available for use under the Emergency Use Authorization (EUA) only.          SARS-CoV-2 Source NP Swab    Narrative:      Release to patient->Immediate    URINALYSIS (UA) [[id number]] Collected: 09/07/23 0000    Order Status: Canceled Specimen: Urine, Clean Catch             IMAGING:  DX-CHEST-PORTABLE (1 VIEW)   Final Result         1.  No acute cardiopulmonary disease.          MEDS:  Current Facility-Administered Medications   Medication Last Admin    tamsulosin (Flomax) capsule 0.4 mg 0.4 mg at 09/10/23 0930    acetaminophen (Tylenol) tablet 1,000 mg 1,000 mg at 09/11/23 0518    Followed by    [START ON 9/15/2023] acetaminophen (Tylenol) tablet 1,000 mg      Pharmacy Consult Request ...Pain Management  Review 1 Each      LORazepam (Ativan) tablet 0.5 mg      LORazepam (Ativan) tablet 1 mg      Or    LORazepam (Ativan) injection 0.5 mg      LORazepam (Ativan) tablet 2 mg      Or    LORazepam (Ativan) injection 1 mg      LORazepam (Ativan) tablet 3 mg      Or    LORazepam (Ativan) injection 1.5 mg      LORazepam (Ativan) tablet 4 mg      Or    LORazepam (Ativan) injection 2 mg      fluticasone (Flovent HFA) 44 MCG/ACT inhaler 88 mcg 88 mcg at 09/10/23 1719    dexamethasone (Decadron) tablet 6 mg 6 mg at 09/11/23 0518    [Held by provider] rivaroxaban (Xarelto) tablet 10 mg 10 mg at 09/09/23 1812    thiamine (Vitamin B-1) tablet 100 mg 100 mg at 09/11/23 0521    erythromycin ophthalmic ointment 1 Application 1 Application at 09/11/23 0519    senna-docusate (Pericolace Or Senokot S) 8.6-50 MG per tablet 2 Tablet 2 Tablet at 09/11/23 0518    And    polyethylene glycol/lytes (Miralax) PACKET 1 Packet      And    magnesium hydroxide (Milk Of Magnesia) suspension 30 mL      And    bisacodyl (Dulcolax) suppository 10 mg      ondansetron (Zofran) syringe/vial injection 4 mg      ondansetron (Zofran ODT) dispertab 4 mg      promethazine (Phenergan) tablet 12.5-25 mg      promethazine (Phenergan) suppository 12.5-25 mg      prochlorperazine (Compazine) injection 5-10 mg      folic acid (Folvite) tablet 1 mg 1 mg at 09/11/23 0518    multivitamin tablet 1 Tablet 1 Tablet at 09/11/23 0518    polymixin-trimethoprim (Polytrim) 37048-6.1 UNIT/ML-% ophthalmic solution 1 Drop 1 Drop at 09/11/23 0519       PROBLEM LIST:  No problems updated.    ASSESSMENT/PLAN: 58 y.o. male with alcohol use disorder and hypoxia from COVID-19 was admitted on 9/7/23 for alcohol dependence with withdrawal delirium.     Alcohol dependence with withdrawal delirium  -Patient treated in the ED with phenobarbital, Lorazepam, and diazepam as well as fluids, thiamine, folic acid, and magnesium supplement. CIWA 27. He was moved to the ICU as he remained agitated  after medication administration and was given IV phenobarbital as well as high dose IV thiamine.   -patient is without hallucinations, agitation, or tremors. Current medications include 1mg folic acid supplement, multivitamin, and thiamine tablet.   -Patient also taking Lorazepam.   COVID-19  -Patient states that he started showing symptoms around 10 days ago but tested himself at home on 9/8/23. Patient denies any shortness of breath but still has some nasal congestion. He is off of the NC O2 and saturating at 97%.  -Currently taking dexamethasone 6mg tab.   JARVIS  -using nocturnal CPAP/BIPAP  Hypomagnesemia  -Current 2.0.  Thrombocytopenia  -Patient's current platelets are 97 and INR 0.97. He is bleeding from his penis after most likely traumatic batres insertion. Xarelto 10mg being held after bleeding episode. No history of bleeding disorders.   -Patient on Tamsulosin.  -On rounds patient had gross hematuria. Getting a bladder scan to see if patient is retaining urine and if so, will have urology place new batres.   -Working patient up for macrocytic anemia: immature platelet fraction, fibrinogen, TSH all within normal limits. Vitamin B12 1458.   Conjunctivitis   -Patient is taking Polytrim and erythromycin ointment and says that his eyes do feel better.   Alcohol hepatitis  -Patient does complain of epigastric pain but thinks it is just from coughing.   -His transaminases have come down from AST 22 and ALT 36.   Hyponatremia  -Sodium remains low at 137.   Hypokalemia   -Current 3.3.    #Disposition: Patient is not medically cleared for discharge.     Core Measures:  Fluids: none  Lines: None  Abx: None  Diet: Regular  PPX: Xarelto being held    CODE STATUS: FULL    ANASTACIO Abbott-S2  [facility]

## 2023-09-11 NOTE — DISCHARGE PLANNING
RNDARRELL received call from pt's employer; Hospital admission letter emailed to [email or web address]

## 2023-09-11 NOTE — ASSESSMENT & PLAN NOTE
Resolved   Severe alcohol withdrawal delirium upon presentation  S/P IV phenobarbital in ICU   Cessation counseling when clinically appropriate  Thiamine and supplemental vitamins daily

## 2023-09-11 NOTE — ASSESSMENT & PLAN NOTE
Resolving   Patient and family report different dates of last drink  With initial admit to the floor though required transfer to the ICU for IV phenobarbital. Transferred to the floor on 9/9.  S/p IV detox bag as well as aida rudd K  - Continue CIWA protocol  - Thiamine, Folate and Multivitamin repletion  - Case management for outpatient detox options.

## 2023-09-11 NOTE — ASSESSMENT & PLAN NOTE
Suspected due to traumatic Batres insertion/removal. Hgb remains stable. PVR on 9/11 was 195 mL.  - Monitor for any urinary retention  - Continue holding all blood thinners  - If patient is retaining urine, he will require batres insertion by urology.

## 2023-09-11 NOTE — CARE PLAN
The patient is Stable - Low risk of patient condition declining or worsening    Shift Goals  Clinical Goals: Monitor Is & Os and bleeding  Patient Goals: control bleeding, go home  Family Goals: BRENDA    Progress made toward(s) clinical / shift goals:      Problem: Knowledge Deficit - Standard  Goal: Patient and family/care givers will demonstrate understanding of plan of care, disease process/condition, diagnostic tests and medications  Description: Target End Date:  1-3 days or as soon as patient condition allows    Document in Patient Education    1.  Patient and family/caregiver oriented to unit, equipment, visitation policy and means for communicating concern  2.  Complete/review Learning Assessment  3.  Assess knowledge level of disease process/condition, treatment plan, diagnostic tests and medications  4.  Explain disease process/condition, treatment plan, diagnostic tests and medications  Outcome: Progressing  Note: Patient updated regarding his plan of care. Patient has no pain just has concerns regarding his penile bleeding. Patient states that he is not having any SOB while being of the oxygen.  No increase in respiratory effort observed while patient ambulated. No signs of detoxification noted upon examination.      Problem: Fall Risk  Goal: Patient will remain free from falls  Description: Target End Date:  Prior to discharge or change in level of care    Document interventions on the [facility] Fall Risk Assessment    1.  Assess for fall risk factors  2.  Implement fall precautions  Outcome: Progressing  Note: Patient currently is a low fall risk. Patient still calls for assistance when he uses the restroom because he is bleeding from his penis. Patient has a steady gait. Patient does not require any assistive devices. Bedside table and call light placed within reach. Bed set to lowest position and locked. Patient walks with a washcloth around his penis to prevent blood from dripping on the floor and  falling.

## 2023-09-11 NOTE — PROGRESS NOTES
[facility] Internal Medicine Daily Progress Note    Date of Service  9/11/2023    R Team: R IM Green Team   Attending: Elliot Fuentes M.d.  Senior Resident: Dr. Manley  Intern:  Dr. Gavin  Contact Number: [phone]    Chief Complaint  Corona Drummond is a 58 y.o. male admitted 9/7/2023 with alcohol detoxification.    Hospital Course  Mr. Drummond is a 58 y.o. male with history of alcohol abuse who presented 9/7/2023 with hallucinations and tremors in the setting of alcohol detox with unknown last drink. Patient was found to be tremulous and tachycardic in ED where he received phonobarbital and diazepam, and IV fluids. He was also found to be COVID positive, and received oxygen supplementation and decardron. CIWA protocol was initiated and patient was seen and evaluated in ICU after high scores. He was transferred to floor where he continued to receive vitamin and electrolyte supplementation, monitored by CIWA, oxygen supplementation and decadron for management of COVID-19 infection. Patient was found with transaminitis, which resolved during inpatient course. Patient had episode of hematuria during placement and during removal of Ace catheter, resolved over time and with compression.     Interval Problem Update  NAEO. Patient reports his hematuria seems to be resolving since yesterday although he stated he recently urinated and the color of the urine was bright red. He denies any difficulty with urination.     PVR bladder scan demonstrated 195mL.     I have discussed this patient's plan of care and discharge plan at IDT rounds today with Case Management, Nursing, Nursing leadership, and other members of the IDT team.    Consultants/Specialty  critical care    Code Status  Full Code    Disposition  The patient is not medically cleared for discharge to home or a post-acute facility.      I have placed the appropriate orders for post-discharge needs.    Review of Systems  Review of Systems   Constitutional:   Negative for chills and fever.   Eyes:  Positive for redness.   Respiratory:  Negative for shortness of breath.    Cardiovascular:  Negative for chest pain.   Gastrointestinal:  Negative for blood in stool, constipation, diarrhea, nausea and vomiting.   Genitourinary:  Positive for hematuria. Negative for dysuria, flank pain and urgency.   Musculoskeletal:  Negative for myalgias.   Neurological:  Negative for tremors.   Psychiatric/Behavioral:  Negative for memory loss and substance abuse.    All other systems reviewed and are negative.       Physical Exam  Temp:  [36.2 °C (97.1 °F)-37.6 °C (99.6 °F)] 36.6 °C (97.9 °F)  Pulse:  [69-85] 77  Resp:  [16-18] 16  BP: (124-138)/(76-91) 130/80  SpO2:  [92 %-97 %] 93 %    Physical Exam  Vitals and nursing note reviewed.   Constitutional:       General: He is not in acute distress.     Appearance: He is not ill-appearing or toxic-appearing.   HENT:      Head: Atraumatic.      Nose: Congestion present.      Mouth/Throat:      Pharynx: No posterior oropharyngeal erythema.   Eyes:      Extraocular Movements: Extraocular movements intact.   Cardiovascular:      Rate and Rhythm: Normal rate and regular rhythm.   Pulmonary:      Effort: Pulmonary effort is normal.      Breath sounds: Normal breath sounds. No stridor. No wheezing or rales.   Chest:      Chest wall: No tenderness.   Abdominal:      General: There is no distension.      Tenderness: There is no abdominal tenderness. There is no right CVA tenderness or left CVA tenderness.   Genitourinary:     Penis: Normal.       Comments: Bright red blood seen in toilet  Musculoskeletal:      Right lower leg: No edema.      Left lower leg: No edema.   Skin:     General: Skin is warm.   Neurological:      Mental Status: He is alert.      Motor: No weakness.      Comments: No tremor   Psychiatric:         Mood and Affect: Mood normal.         Behavior: Behavior normal.         Fluids    Intake/Output Summary (Last 24 hours) at  9/11/2023 1224  Last data filed at 9/11/2023 0800  Gross per 24 hour   Intake 1030 ml   Output 750 ml   Net 280 ml         Laboratory  Recent Labs     09/10/23  0723 09/10/23  1232 09/11/23  0448   WBC 7.1 8.5 6.1   RBC 4.92 5.28 4.60*   HEMOGLOBIN 16.8 17.4 15.5   HEMATOCRIT 50.1 52.5* 46.4   .8* 99.4* 100.9*   MCH 34.1* 33.0 33.7*   MCHC 33.5 33.1 33.4   RDW 50.5* 49.3 49.3   PLATELETCT 91* 114* 97*   MPV 10.5 10.8 11.1       Recent Labs     09/09/23  0910 09/10/23  0723 09/11/23  0448   SODIUM 133* 134* 137   POTASSIUM 4.4 3.6 3.3*   CHLORIDE 100 100 103   CO2 20 24 24   GLUCOSE 110* 100* 86   BUN 17 19 17   CREATININE 0.64 0.73 0.83   CALCIUM 8.7 8.7 8.7                       Imaging  DX-CHEST-PORTABLE (1 VIEW)   Final Result         1.  No acute cardiopulmonary disease.             Assessment/Plan  Problem Representation:    * Alcohol dependence with withdrawal delirium (HCC)- (present on admission)  Assessment & Plan  Resolving   Patient and family report different dates of last drink  With initial admit to the floor though required transfer to the ICU for IV phenobarbital. Transferred to the floor on 9/9.  S/p IV detox bag as well as mag, phos, K  - Continue [facility] protocol  - Thiamine, Folate and Multivitamin repletion  - Case management for outpatient detox options.     Hematuria  Assessment & Plan  Suspected due to traumatic Batres insertion/removal. Hgb remains stable. PVR on 9/11 was 195 mL.  - Monitor for any urinary retention  - Continue holding all blood thinners  - If patient is retaining urine, he will require batres insertion by urology.     JARVIS (obstructive sleep apnea)- (present on admission)  Assessment & Plan  Diagnosed 10 years ago but he was intolerant to CPAP and hasn't used it.    Alcohol withdrawal delirium (HCC)- (present on admission)  Assessment & Plan  Resolved   Severe alcohol withdrawal delirium upon presentation  S/P IV phenobarbital in ICU   Cessation counseling when clinically  appropriate  Thiamine and supplemental vitamins daily     SARS-CoV-2 positive- (present on admission)  Assessment & Plan  CXR unremarkable for secondary bacterial infection. Patient was initially hypoxic upon presentation but has since not been requiring oxygen.  - Continue Decadron 6mg (last dose on 9/17)    Hypomagnesemia- (present on admission)  Assessment & Plan  Resolved  - Daily BMPs with Mg     Thrombocytopenia (HCC)- (present on admission)  Assessment & Plan  Likely due to bone marrow suppression secondary to alcohol  High MCV  - TSH, B12 wnl     Conjunctivitis of both eyes  Assessment & Plan  Erythromycin and polymyxin/trimethoprim eyedrops until 9/12    Alcoholic hepatitis  Assessment & Plan  Patient with transaminitis likely secondary to alcoholic hepatitis, now resolved      Hyponatremia  Assessment & Plan  Resolving   Secondary to dedydration  S/P normal saline     Hypokalemia  Assessment & Plan  Replete as needed.          VTE prophylaxis: SCDs/TEDs    I have performed a physical exam and reviewed and updated ROS and Plan today (9/11/2023). In review of yesterday's note (9/10/2023), there are no changes except as documented above.

## 2023-09-11 NOTE — CARE PLAN
The patient is Stable - Low risk of patient condition declining or worsening    Shift Goals  Clinical Goals: Remove batres catheter and monitor for bleeding  Patient Goals: Get better and go home  Family Goals: BRENDA    Progress made toward(s) clinical / shift goals:  Patient batres cath removed successfully, patient is able to urinate and continues with bloody urine. Educated patient on bladder management and bleeding per penis. Patient verbalizes he will use urinal and minimize getting up to urinate at this time until bleeding is resolved or decreased.

## 2023-09-11 NOTE — CARE PLAN
The patient is Watcher - Medium risk of patient condition declining or worsening    Shift Goals  Clinical Goals: Monitor I&O's, Daily Weights, assess penis for bloody discharge  Patient Goals: Go home  Family Goals: BRENDA    Progress made toward(s) clinical / shift goals:        Problem: Pain - Standard  Goal: Alleviation of pain or a reduction in pain to the patient’s comfort goal  Outcome: Progressing  Note: Patient is currently not complaining at of pain at this time     Problem: Knowledge Deficit - Standard  Goal: Patient and family/care givers will demonstrate understanding of plan of care, disease process/condition, diagnostic tests and medications  Outcome: Progressing  Note: Patient is able to verbalized understanding reason for admission, plan of care, medication being administered. Corona is able to verbalized disease process and treatment plan.

## 2023-09-11 NOTE — ASSESSMENT & PLAN NOTE
CXR unremarkable for secondary bacterial infection. Patient was initially hypoxic upon presentation but has since not been requiring oxygen.  - Continue Decadron 6mg (last dose on 9/17)

## 2023-09-11 NOTE — PROGRESS NOTES
Received bedside shift report from ISAEL Esquivel. Patient awake in room. Requesting water. Water provided. Patient does not appear to be in distress at this.

## 2023-09-11 NOTE — DISCHARGE PLANNING
Case Management Discharge Planning    Admission Date: 9/7/2023  GMLOS: 5  ALOS: 4    6-Clicks ADL Score: 23  6-Clicks Mobility Score: 22      Anticipated Discharge Dispo: Discharge Disposition: Discharged to home/self care (01)    DME Needed: No    Action(s) Taken: Updated Provider/Nurse on Discharge Plan    Escalations Completed: None    Medically Clear: No    Barriers to Discharge: Medical clearance    **1100  Patient discussed in IDT rounds. Per providers, patient is not medically cleared at this. May be able to discharge home tomorrow if he is not retaining urine. LSW following. No CM needs identified at this time.

## 2023-09-11 NOTE — HOSPITAL COURSE
Mr. Drummond is a 58 y.o. male with history of alcohol abuse who presented 9/7/2023 with hallucinations and tremors in the setting of alcohol detox with unknown last drink. Patient was found to be tremulous and tachycardic in ED where he received phonobarbital and diazepam, and IV fluids. He was also found to be COVID positive, and received oxygen supplementation and decardron. CIWA protocol was initiated and patient was seen and evaluated in ICU after high scores. He was transferred to floor where he continued to receive vitamin and electrolyte supplementation, monitored by CIWA, oxygen supplementation and decadron for management of COVID-19 infection. Patient was found with transaminitis, which resolved during inpatient course. Patient had episode of hematuria during placement and during removal of Ace catheter, resolved over time and with compression.

## 2023-09-12 VITALS
OXYGEN SATURATION: 92 % | TEMPERATURE: 97.3 F | DIASTOLIC BLOOD PRESSURE: 98 MMHG | SYSTOLIC BLOOD PRESSURE: 136 MMHG | BODY MASS INDEX: 36.52 KG/M2 | WEIGHT: 269.62 LBS | HEIGHT: 72 IN | RESPIRATION RATE: 16 BRPM | HEART RATE: 80 BPM

## 2023-09-12 LAB
ALBUMIN SERPL BCP-MCNC: 3.6 G/DL (ref 3.2–4.9)
ALBUMIN/GLOB SERPL: 1.4 G/DL
ALP SERPL-CCNC: 63 U/L (ref 30–99)
ALT SERPL-CCNC: 32 U/L (ref 2–50)
ANION GAP SERPL CALC-SCNC: 10 MMOL/L (ref 7–16)
AST SERPL-CCNC: 20 U/L (ref 12–45)
BASOPHILS # BLD AUTO: 0.5 % (ref 0–1.8)
BASOPHILS # BLD: 0.03 K/UL (ref 0–0.12)
BILIRUB SERPL-MCNC: 0.4 MG/DL (ref 0.1–1.5)
BUN SERPL-MCNC: 15 MG/DL (ref 8–22)
CALCIUM ALBUM COR SERPL-MCNC: 9.4 MG/DL (ref 8.5–10.5)
CALCIUM SERPL-MCNC: 9.1 MG/DL (ref 8.5–10.5)
CHLORIDE SERPL-SCNC: 105 MMOL/L (ref 96–112)
CO2 SERPL-SCNC: 23 MMOL/L (ref 20–33)
CREAT SERPL-MCNC: 0.86 MG/DL (ref 0.5–1.4)
EOSINOPHIL # BLD AUTO: 0.08 K/UL (ref 0–0.51)
EOSINOPHIL NFR BLD: 1.3 % (ref 0–6.9)
ERYTHROCYTE [DISTWIDTH] IN BLOOD BY AUTOMATED COUNT: 49.1 FL (ref 35.9–50)
GFR SERPLBLD CREATININE-BSD FMLA CKD-EPI: 100 ML/MIN/1.73 M 2
GLOBULIN SER CALC-MCNC: 2.6 G/DL (ref 1.9–3.5)
GLUCOSE SERPL-MCNC: 96 MG/DL (ref 65–99)
HCT VFR BLD AUTO: 45.2 % (ref 42–52)
HGB BLD-MCNC: 15 G/DL (ref 14–18)
IMM GRANULOCYTES # BLD AUTO: 0.03 K/UL (ref 0–0.11)
IMM GRANULOCYTES NFR BLD AUTO: 0.5 % (ref 0–0.9)
LYMPHOCYTES # BLD AUTO: 1.33 K/UL (ref 1–4.8)
LYMPHOCYTES NFR BLD: 21.2 % (ref 22–41)
MAGNESIUM SERPL-MCNC: 2 MG/DL (ref 1.5–2.5)
MCH RBC QN AUTO: 33.5 PG (ref 27–33)
MCHC RBC AUTO-ENTMCNC: 33.2 G/DL (ref 32.3–36.5)
MCV RBC AUTO: 100.9 FL (ref 81.4–97.8)
MONOCYTES # BLD AUTO: 1.11 K/UL (ref 0–0.85)
MONOCYTES NFR BLD AUTO: 17.7 % (ref 0–13.4)
NEUTROPHILS # BLD AUTO: 3.69 K/UL (ref 1.82–7.42)
NEUTROPHILS NFR BLD: 58.8 % (ref 44–72)
NRBC # BLD AUTO: 0 K/UL
NRBC BLD-RTO: 0 /100 WBC (ref 0–0.2)
PHOSPHATE SERPL-MCNC: 3.4 MG/DL (ref 2.5–4.5)
PLATELET # BLD AUTO: 119 K/UL (ref 164–446)
PMV BLD AUTO: 10.8 FL (ref 9–12.9)
POTASSIUM SERPL-SCNC: 3.7 MMOL/L (ref 3.6–5.5)
PROT SERPL-MCNC: 6.2 G/DL (ref 6–8.2)
RBC # BLD AUTO: 4.48 M/UL (ref 4.7–6.1)
SODIUM SERPL-SCNC: 138 MMOL/L (ref 135–145)
WBC # BLD AUTO: 6.3 K/UL (ref 4.8–10.8)

## 2023-09-12 PROCEDURE — A9270 NON-COVERED ITEM OR SERVICE: HCPCS | Performed by: STUDENT IN AN ORGANIZED HEALTH CARE EDUCATION/TRAINING PROGRAM

## 2023-09-12 PROCEDURE — 99239 HOSP IP/OBS DSCHRG MGMT >30: CPT | Mod: GC | Performed by: INTERNAL MEDICINE

## 2023-09-12 PROCEDURE — 700102 HCHG RX REV CODE 250 W/ 637 OVERRIDE(OP): Performed by: INTERNAL MEDICINE

## 2023-09-12 PROCEDURE — A9270 NON-COVERED ITEM OR SERVICE: HCPCS | Performed by: INTERNAL MEDICINE

## 2023-09-12 PROCEDURE — 700102 HCHG RX REV CODE 250 W/ 637 OVERRIDE(OP): Performed by: HOSPITALIST

## 2023-09-12 PROCEDURE — 83735 ASSAY OF MAGNESIUM: CPT

## 2023-09-12 PROCEDURE — 85025 COMPLETE CBC W/AUTO DIFF WBC: CPT

## 2023-09-12 PROCEDURE — A9270 NON-COVERED ITEM OR SERVICE: HCPCS | Performed by: HOSPITALIST

## 2023-09-12 PROCEDURE — 700102 HCHG RX REV CODE 250 W/ 637 OVERRIDE(OP): Performed by: STUDENT IN AN ORGANIZED HEALTH CARE EDUCATION/TRAINING PROGRAM

## 2023-09-12 PROCEDURE — 80053 COMPREHEN METABOLIC PANEL: CPT

## 2023-09-12 PROCEDURE — 84100 ASSAY OF PHOSPHORUS: CPT

## 2023-09-12 RX ORDER — DEXAMETHASONE 6 MG/1
6 TABLET ORAL DAILY
Qty: 4 TABLET | Refills: 0 | Status: SHIPPED | OUTPATIENT
Start: 2023-09-13 | End: 2023-09-12

## 2023-09-12 RX ADMIN — THERA TABS 1 TABLET: TAB at 05:26

## 2023-09-12 RX ADMIN — ACETAMINOPHEN 1000 MG: 500 TABLET ORAL at 05:27

## 2023-09-12 RX ADMIN — DEXAMETHASONE 6 MG: 6 TABLET ORAL at 05:27

## 2023-09-12 RX ADMIN — POLYMYXIN B SULFATE AND TRIMETHOPRIM 1 DROP: 10000; 1 SOLUTION OPHTHALMIC at 01:21

## 2023-09-12 RX ADMIN — Medication 100 MG: at 05:26

## 2023-09-12 RX ADMIN — ACETAMINOPHEN 1000 MG: 500 TABLET ORAL at 01:20

## 2023-09-12 RX ADMIN — FOLIC ACID 1 MG: 1 TABLET ORAL at 05:26

## 2023-09-12 RX ADMIN — FLUTICASONE PROPIONATE 88 MCG: 44 AEROSOL, METERED RESPIRATORY (INHALATION) at 05:26

## 2023-09-12 RX ADMIN — ERYTHROMYCIN 1 APPLICATION: 5 OINTMENT OPHTHALMIC at 05:25

## 2023-09-12 RX ADMIN — POLYMYXIN B SULFATE AND TRIMETHOPRIM 1 DROP: 10000; 1 SOLUTION OPHTHALMIC at 05:25

## 2023-09-12 RX ADMIN — SENNOSIDES AND DOCUSATE SODIUM 2 TABLET: 50; 8.6 TABLET ORAL at 05:27

## 2023-09-12 RX ADMIN — ERYTHROMYCIN 1 APPLICATION: 5 OINTMENT OPHTHALMIC at 01:21

## 2023-09-12 RX ADMIN — POLYMYXIN B SULFATE AND TRIMETHOPRIM 1 DROP: 10000; 1 SOLUTION OPHTHALMIC at 09:01

## 2023-09-12 RX ADMIN — TAMSULOSIN HYDROCHLORIDE 0.4 MG: 0.4 CAPSULE ORAL at 09:01

## 2023-09-12 ASSESSMENT — FIBROSIS 4 INDEX: FIB4 SCORE: 1.75

## 2023-09-12 ASSESSMENT — PAIN DESCRIPTION - PAIN TYPE: TYPE: ACUTE PAIN

## 2023-09-12 NOTE — PROGRESS NOTES
Received bedside shift report form José GARCIA RN. Patient is laying in bed awake watching TV. Call light and personal belonging within reach. Tele box in place. Bed locked and in lowest position

## 2023-09-12 NOTE — DISCHARGE INSTRUCTIONS
COVID-19  COVID-19, or coronavirus disease 2019, is an infection that is caused by a new (novel) coronavirus called SARS-CoV-2. COVID-19 can cause many symptoms. In some people, the virus may not cause any symptoms. In others, it may cause mild or severe symptoms. Some people with severe infection develop severe disease.  What are the causes?  This illness is caused by a virus. The virus may be in the air as tiny specks of fluid (aerosols) or droplets, or it may be on surfaces. You may catch the virus by:  Breathing in droplets from an infected person. Droplets can be spread by a person breathing, speaking, singing, coughing, or sneezing.  Touching something, like a table or a doorknob, that has virus on it (is contaminated) and then touching your mouth, nose, or eyes.  What increases the risk?  Risk for infection:  You are more likely to get infected with the COVID-19 virus if:  You are within 6 ft (1.8 m) of a person with COVID-19 for 15 minutes or longer.  You are providing care for a person who is infected with COVID-19.  You are in close personal contact with other people. Close personal contact includes hugging, kissing, or sharing eating or drinking utensils.  Risk for serious illness caused by COVID-19:  You are more likely to get seriously ill from the COVID-19 virus if:  You have cancer.  You have a long-term (chronic) disease, such as:  Chronic lung disease. This includes pulmonary embolism, chronic obstructive pulmonary disease, and cystic fibrosis.  Long-term disease that lowers your body's ability to fight infection (immunocompromise).  Serious cardiac conditions, such as heart failure, coronary artery disease, or cardiomyopathy.  Diabetes.  Chronic kidney disease.  Liver diseases. These include cirrhosis, nonalcoholic fatty liver disease, alcoholic liver disease, or autoimmune hepatitis.  You have obesity.  You are pregnant or were recently pregnant.  You have sickle cell disease.  What are the signs  or symptoms?  Symptoms of this condition can range from mild to severe. Symptoms may appear any time from 2 to 14 days after being exposed to the virus. They include:  Fever or chills.  Shortness of breath or trouble breathing.  Feeling tired or very tired.  Headaches, body aches, or muscle aches.  Runny or stuffy nose, sneezing, coughing, or sore throat.  New loss of taste or smell. This is rare.  Some people may also have stomach problems, such as nausea, vomiting, or diarrhea.  Other people may not have any symptoms of COVID-19.  How is this diagnosed?  This condition may be diagnosed by testing samples to check for the COVID-19 virus. The most common tests are the PCR test and the antigen test. Tests may be done in the lab or at home. They include:  Using a swab to take a sample of fluid from the back of your nose and throat (nasopharyngeal fluid), from your nose, or from your throat.  Testing a sample of saliva from your mouth.  Testing a sample of coughed-up mucus from your lungs (sputum).  How is this treated?  Treatment for COVID-19 infection depends on the severity of the condition.  Mild symptoms can be managed at home with rest, fluids, and over-the-counter medicines.  Serious symptoms may be treated in a hospital intensive care unit (ICU). Treatment in the ICU may include:  Supplemental oxygen. Extra oxygen is given through a tube in the nose, a face mask, or a yanez.  Medicines. These may include:  Antivirals, such as monoclonal antibodies. These help your body fight off certain viruses that can cause disease.  Anti-inflammatories, such as corticosteroids. These reduce inflammation and suppress the immune system.  Antithrombotics. These prevent or treat blood clots, if they develop.  Convalescent plasma. This helps boost your immune system, if you have an underlying immunosuppressive condition or are getting immunosuppressive treatments.  Prone positioning. This means you will lie on your stomach. This  helps oxygen to get into your lungs.  Infection control measures.  If you are at risk for more serious illness caused by COVID-19, your health care provider may prescribe two long-acting monoclonal antibodies, given together every 6 months.  How is this prevented?  To protect yourself:  Use preventive medicine (pre-exposure prophylaxis). You may get pre-exposure prophylaxis if you have moderate or severe immunocompromise.  Get vaccinated. Anyone 6 months old or older who meets guidelines can get a COVID-19 vaccine or vaccine series. This includes people who are pregnant or making breast milk (lactating).  Get an added dose of COVID-19 vaccine after your first vaccine or vaccine series if you have moderate to severe immunocompromise. This applies if you have had a solid organ transplant or have been diagnosed with an immunocompromising condition.  You should get the added dose 4 weeks after you got the first COVID-19 vaccine or vaccine series.  If you get an mRNA vaccine, you will need a 3-dose primary series.  If you get the J&J/Cali vaccine, you will need a 2-dose primary series, with the second dose being an mRNA vaccine.  Talk to your health care provider about getting experimental monoclonal antibodies. This treatment is approved under emergency use authorization to prevent severe illness before or after being exposed to the COVID-19 virus. You may be given monoclonal antibodies if:  You have moderate or severe immunocompromise. This includes treatments that lower your immune response. People with immunocompromise may not develop protection against COVID-19 when they are vaccinated.  You cannot be vaccinated. You may not get a vaccine if you have a severe allergic reaction to the vaccine or its components.  You are not fully vaccinated.  You are in a facility where COVID-19 is present and:  Are in close contact with a person who is infected with the COVID-19 virus.  Are at high risk of being exposed to the  COVID-19 virus.  You are at risk of illness from new variants of the COVID-19 virus.  To protect others:  If you have symptoms of COVID-19, take steps to prevent the virus from spreading to others.  Stay home. Leave your house only to get medical care. Do not use public transit, if possible.  Do not travel while you are sick.  Wash your hands often with soap and water for at least 20 seconds. If soap and water are not available, use alcohol-based hand .  Make sure that all people in your household wash their hands well and often.  Cough or sneeze into a tissue or your sleeve or elbow. Do not cough or sneeze into your hand or into the air.  Where to find more information  Centers for Disease Control and Prevention: www.cdc.gov/coronavirus  World Health Organization: www.who.int/health-topics/coronavirus  Get help right away if:  You have trouble breathing.  You have pain or pressure in your chest.  You are confused.  You have bluish lips and fingernails.  You have trouble waking from sleep.  You have symptoms that get worse.  These symptoms may be an emergency. Get help right away. Call 911.  Do not wait to see if the symptoms will go away.  Do not drive yourself to the hospital.  Summary  COVID-19 is an infection that is caused by a new coronavirus.  Sometimes, there are no symptoms. Other times, symptoms range from mild to severe. Some people with a severe COVID-19 infection develop severe disease.  The virus that causes COVID-19 can spread from person to person through droplets or aerosols from breathing, speaking, singing, coughing, or sneezing.  Mild symptoms of COVID-19 can be managed at home with rest, fluids, and over-the-counter medicines.  This information is not intended to replace advice given to you by your health care provider. Make sure you discuss any questions you have with your health care provider.  Document Revised: 12/08/2022 Document Reviewed: 12/08/2022  [employer] Patient Education ©  2023 [employer]  Alcohol Intoxication  Alcohol intoxication happens when you cannot think clearly or function well after drinking alcohol. This can happen after just one drink. The effect that alcohol has on how you think and function depends on:  How much alcohol you drank.  Your age, your weight, and whether you are a man or a woman.  How often you drink alcohol.  If you have other medical problems.  Alcohol intoxication can range from mild to severe. It can be dangerous, especially if:  You drink a large amount of alcohol in a short time (binge drink).  You drink alcohol and take drugs or medicines.  What are the causes?  This condition is caused by drinking alcohol.  What increases the risk?  Peer pressure in young adults.  Difficulty managing stress.  History of drug or alcohol abuse.  Drinking alcohol and taking drugs.  Family history of drug or alcohol abuse.  Low body weight.  Binge drinking.  What are the signs or symptoms?  Feeling relaxed or sleepy.  Having mild difficulty with coordination, speech, memory, or attention.  Strong anger or extreme sadness.  Severe difficulty with coordination, speech, memory, or attention.  Other symptoms may include:  Passing out.  Vomiting.  Confusion.  Slow breathing.  Coma.  How is this treated?  This condition may be treated with:  Close monitoring in the hospital.  IV fluids to add water in your body.  Medicine to treat vomiting or to get rid of alcohol in the body.  Counseling about the dangers of alcohol.  Oxygen therapy or a breathing machine (ventilator).  You may also be treated for substance use disorder.  Follow these instructions at home:  Eating and drinking    Do not drink alcohol if:  Your doctor tells you not to drink.  You are pregnant, may be pregnant, or are planning to get pregnant.  You are under the legal drinking age (21 years old in the [place]  You are taking medicines that you should not take with alcohol.  Alcohol causes your medical problem  to get worse.  You have to drive or do activities that need you to be alert.  You have substance use disorder. This is when using alcohol again and again causes problems with your health, your relationships, or with what you need to do at work, home, or school.  Ask your doctor if alcohol is safe for you. If you are allowed to drink, limit how much you have to:  0-1 drink a day for women who are not pregnant.  0-2 drinks a day for men.  Know how much alcohol is in your drink. In the [place] one drink equals one 12 oz bottle of beer (355 mL), one 5 oz glass of wine (148 mL), or one 1½ oz glass of hard liquor (44 mL).  Be sure to eat before you drink alcohol.  Alcohol increases peeing. It is important to stay hydrated and avoid caffeine.  Caffeine may be in coffee, tea, and some sodas.  Caffeine can make you thirsty.  Do not drink more than one drink in an hour.  If you are having more than one drink, have a drink without alcohol (such as water) between your drinks.  General instructions    Take over-the-counter and prescription medicines only as told by your doctor.  Do not drive after drinking any amount of alcohol. Plan for a designated  or another way to go home.  Have someone you trust stay with you while you are intoxicated. Youshould not be left alone.  Contact a doctor if:  You do not feel better after a few days.  You have problems at work, at school, or at home due to drinking.  You have trouble with any of the following:  Movement.  Talking.  Memory.  Paying attention to things.  Get help right away if:  You have trouble staying awake.  You are light-headed or faint.  You feel very confused.  You have trouble staying awake.  You are vomiting blood. The blood may be bright red or look like coffee grounds.  You have been told that you have had jerky movements that you cannot control (seizure).  You have blood in your poop (stool). The blood may:  Be bright red.  Make your poop black and tarry and make it  smell bad.  These symptoms may be an emergency. Get help right away. Call 911.  Do not wait to see if the symptoms will go away.  Do not drive yourself to the hospital.  Also, get help right away if:  You have thoughts about hurting yourself or others.  Take one of these steps if you feel like you may hurt yourself or others, or have thoughts about taking your own life:  Call 911.  Call the National Suicide Prevention Lifeline at 1-414.820.2934 or 004. This is open 24 hours a day.  Text the Crisis Text Line at 703019.  Summary  Alcohol intoxication happens when you cannot think clearly or function well after drinking alcohol. This can happen after just one drink.  If your doctor says that alcohol is safe for you, limit how much you drink.  Contact a doctor if you have problems at work, at school, or at home due to drinking.  Get help right away if you have thoughts about hurting yourself or others.  This information is not intended to replace advice given to you by your health care provider. Make sure you discuss any questions you have with your health care provider.  Document Revised: 03/06/2023 Document Reviewed: 03/06/2023  [employer] Patient Education © 2023 [employer]

## 2023-09-12 NOTE — CARE PLAN
The patient is Stable - Low risk of patient condition declining or worsening    Shift Goals  Clinical Goals: Monitor Is & Os, Bladder Scan  Patient Goals: Discharge  Family Goals: BRENDA    Progress made toward(s) clinical / shift goals:    Problem: Knowledge Deficit - Standard  Goal: Patient and family/care givers will demonstrate understanding of plan of care, disease process/condition, diagnostic tests and medications  Outcome: Progressing     Problem: Risk for Aspiration  Goal: Patient's risk for aspiration will be absent or decrease  Outcome: Progressing  Flowsheets (Taken 9/12/2023 3646)  Aspiration Prevention: Assessed for signs and symptoms of aspiration  Head of Bed Elevated: Self regulated  Head Of Bed: 30 degrees or less  Note: Assessed patient's respiratory status, auscultated lung sounds, encouraged pulmonary hygiene.     Problem: Skin Integrity  Goal: Skin integrity is maintained or improved  Outcome: Progressing  Note: Skin remains intact this shift. There are no new wounds or skin issues noted overnight.       Problem: Fall Risk  Goal: Patient will remain free from falls  Outcome: Progressing  Note: Patient has remained free of falls this shift. Instructed patient to use call light for help. Call light always placed within reach when leaving room. Patient's room has been cleared of clutter such as cords and extra chairs.

## 2023-09-12 NOTE — DISCHARGE SUMMARY
"[facility] Discharge Summary    Attending: José Goetz MD  Senior Resident: Dr. Manley   Intern:  Dr. Gavin  Contact Number: [phone]    CHIEF COMPLAINT ON ADMISSION  Chief Complaint   Patient presents with    Hallucinations     BIB EMS from home; family states patient has an episode of visual hallucinations this morning; talking and arguing with nobody in front of his car. Currently patient is AOX4 GCS15, denies HI/SI and any hallucinations at this time; Patient is currently on his 10th day of ETOH detox;  tested positive for Covid this morning on home test and currently  with \"pink eye'.       Reason for Admission  Alcohol withdrawal     Admission Date  9/7/2023    CODE STATUS  Prior    HPI & HOSPITAL COURSE  Corona Drummond is a 57yo male with a PMHx of alcohol abuse who presented on 9/7/23 with hallucinations, delirium, and tremors secondary to alcohol withdrawals. In the ED, the patient was found to be significantly tremulous and tachycardic. He was evaluated in the ICU due to significantly elevated CIWA scores and was given IV phenobarbital as well as high-dose IV thiamine. Patient was additionally found to be Covid positive with hypoxia and was initiated of Decadron. Patient was subsequently transferred back to the floor on 9/9 due to improvement in withdrawals. His CIWA scores improved gradually and the patient was stabilized. On 9/10, the patient developed hematuria which was suspected due to traumatic removal of the batres catheter. His hemoglobin was stable and the hematuria eventually subsided with conservative management. Patient was voiding appropriately with his bladder scan indicating a PVR of 195 mL. Patient was mobilized to assess oxygen needs and it was determined he did not require oxygen on ambulation. Additionally, patient was found to have bacterial conjunctivitis of both eyes and the patient was treated with five days of Erythromycin and polymyxin/trimethoprim eyedrops. On " 9/12, the patient was stable for discharge back to home with close outpatient follow-up. He was counseled on alcohol cessation at discharge. He was discharged on thiamine and multivitamin supplementation.       Therefore, he is discharged in fair and stable condition to home with close outpatient follow-up.    The patient met 2-midnight criteria for an inpatient stay at the time of discharge.    Discharge Date  9/12/2023    Physical Exam on Day of Discharge  Physical Exam  HENT:      Head: Normocephalic.      Nose: Nose normal.      Mouth/Throat:      Mouth: Mucous membranes are moist.   Eyes:      General:         Right eye: No discharge.         Left eye: No discharge.   Cardiovascular:      Rate and Rhythm: Normal rate and regular rhythm.      Pulses: Normal pulses.      Heart sounds: Normal heart sounds.   Pulmonary:      Effort: Pulmonary effort is normal.      Breath sounds: Normal breath sounds.   Abdominal:      Tenderness: There is no abdominal tenderness.   Musculoskeletal:      Cervical back: Normal range of motion.      Right lower leg: No edema.      Left lower leg: No edema.   Skin:     General: Skin is warm and dry.   Neurological:      Mental Status: He is alert and oriented to person, place, and time.   Psychiatric:         Mood and Affect: Mood normal.         FOLLOW UP ITEMS POST DISCHARGE  PCP follow-up     DISCHARGE DIAGNOSES  Principal Problem:    Alcohol dependence with withdrawal delirium (HCC) (POA: Yes)  Active Problems:    Hypokalemia (POA: Unknown)    Hyponatremia (POA: Unknown)    Alcoholic hepatitis (POA: Unknown)    Conjunctivitis of both eyes (POA: Unknown)    Thrombocytopenia (HCC) (POA: Yes)    Hypomagnesemia (POA: Yes)    SARS-CoV-2 positive (POA: Yes)    Alcohol withdrawal delirium (HCC) (POA: Yes)    JARVIS (obstructive sleep apnea) (POA: Yes)    Hematuria (POA: Unknown)  Resolved Problems:    * No resolved hospital problems. *      FOLLOW UP  PCP    Schedule an appointment as soon  as possible for a visit        MEDICATIONS ON DISCHARGE     Medication List        CONTINUE taking these medications        Instructions   BIOTIN PO   Take 1 Tablet by mouth every day.  Dose: 1 Tablet     therapeutic multivitamin-minerals Tabs   Take 1 Tablet by mouth every day.  Dose: 1 Tablet     thiamine 100 MG Tabs  Commonly known as: Vitamin B-1   Take 100 mg by mouth every day.  Dose: 100 mg     Vitamin D3 07919 UNIT Caps   Take 10,000 Units by mouth every day.  Dose: 10,000 Units              Allergies  No Known Allergies    DIET  No orders of the defined types were placed in this encounter.      ACTIVITY  As tolerated.  Weight bearing as tolerated    CONSULTATIONS  Critical Care     PROCEDURES  None    LABORATORY  Lab Results   Component Value Date    SODIUM 138 09/12/2023    POTASSIUM 3.7 09/12/2023    CHLORIDE 105 09/12/2023    CO2 23 09/12/2023    GLUCOSE 96 09/12/2023    BUN 15 09/12/2023    CREATININE 0.86 09/12/2023        Lab Results   Component Value Date    WBC 6.3 09/12/2023    HEMOGLOBIN 15.0 09/12/2023    HEMATOCRIT 45.2 09/12/2023    PLATELETCT 119 (L) 09/12/2023        Total time of the discharge process exceeds 45 minutes.

## 2023-09-12 NOTE — NON-PROVIDER
[facility] INTERNAL MEDICINE PROGRESS NOTE     Attending: José Goetz MD    Resident: Lee Gavin MD    PATIENT: Corona Drummond; [id number]; 1965    ID: 58 y.o. male with alcohol use disorder and hypoxia from COVID-19 was admitted on 9/7/23 for alcohol dependence with withdrawal delirium.     SUBJECTIVE: Patient remained hypertensive overnight. He states that he is feeling well, denies hallucinations, agitation, and tremors. Patient does complain of pruritic eyes. He did have a bowel movement yesterday without blood. Patient says that the blood coming from his penis has improved and he has very minimal bleeding now. When asking patient if he wants to stop drinking, he said that he does and will look into support groups. Patient also denies having shortness of breath.     OBJECTIVE:  Temp:  [36.2 °C (97.1 °F)-36.6 °C (97.9 °F)] 36.6 °C (97.8 °F)  Pulse:  [69-86] 85  Resp:  [16-18] 18  BP: (126-147)/(76-95) 147/95  SpO2:  [92 %-97 %] 92 %      Intake/Output Summary (Last 24 hours) at 9/12/2023 0601  Last data filed at 9/12/2023 0000  Gross per 24 hour   Intake 800 ml   Output 350 ml   Net 450 ml       PE:   General: No acute distress, resting comfortably in bed.  HEENT: NC/AT. EOMI. MMM  Cardiovascular: RRR, no m/r/g, normal S1/S2  Respiratory: Symmetric inspiratory effort. CTAB with no adventitious sounds  Abdomen: BS+, soft, NT/ND   EXT:  KHANNA, 2+ radial pulses, no lower extremity edema bilaterally  Neuro: Non focal, alert and oriented    LABS:  Recent Labs     09/10/23  0723 09/10/23  1232 09/11/23  0448 09/12/23  0344   WBC 7.1 8.5 6.1 6.3   RBC 4.92 5.28 4.60* 4.48*   HEMOGLOBIN 16.8 17.4 15.5 15.0   HEMATOCRIT 50.1 52.5* 46.4 45.2   .8* 99.4* 100.9* 100.9*   MCH 34.1* 33.0 33.7* 33.5*   RDW 50.5* 49.3 49.3 49.1   PLATELETCT 91* 114* 97* 119*   MPV 10.5 10.8 11.1 10.8   NEUTSPOLYS 72.40*  --  57.20 58.80   LYMPHOCYTES 12.90*  --  23.10 21.20*   MONOCYTES 12.60  --  17.40* 17.70*   EOSINOPHILS 1.40  --   1.30 1.30   BASOPHILS 0.30  --  0.30 0.50     Recent Labs     09/10/23  0723 09/11/23  0448 09/12/23  0344   SODIUM 134* 137 138   POTASSIUM 3.6 3.3* 3.7   CHLORIDE 100 103 105   CO2 24 24 23   BUN 19 17 15   CREATININE 0.73 0.83 0.86   CALCIUM 8.7 8.7 9.1   MAGNESIUM 2.0 2.0 2.0   PHOSPHORUS 2.9 3.0 3.4   ALBUMIN 3.4 3.4 3.6     Estimated GFR/CRCL = Estimated Creatinine Clearance: 124.8 mL/min (by C-G formula based on SCr of 0.86 mg/dL).  Recent Labs     09/10/23  0723 09/11/23  0448 09/12/23  0344   GLUCOSE 100* 86 96     Recent Labs     09/10/23  0723 09/11/23  0448 09/12/23  0344   ASTSGOT 31 22 20   ALTSGPT 45 36 32   TBILIRUBIN 0.6 0.6 0.4   ALKPHOSPHAT 72 64 63   GLOBULIN 2.7 2.6 2.6             Recent Labs     09/10/23  0723   FIBRINOGEN 419       MICROBIOLOGY:  Results       Procedure Component Value Units Date/Time    CoV-2, Flu A/B, And RSV by PCR ([employer]atch[employer]!) [[id number]]  (Abnormal) Collected: 09/08/23 0920    Order Status: Completed Specimen: Respirate from Nasopharyngeal Updated: 09/08/23 1219     Influenza virus A RNA Negative     Influenza virus B, PCR Negative     RSV, PCR Negative     SARS-CoV-2 by PCR DETECTED     Comment: **The [employer]! GeneXpert Xpress SARS-CoV-2 RT-PCR Test has been made  available for use under the Emergency Use Authorization (EUA) only.          SARS-CoV-2 Source NP Swab    Narrative:      Release to patient->Immediate    URINALYSIS (UA) [[id number]] Collected: 09/07/23 0000    Order Status: Canceled Specimen: Urine, Clean Catch             IMAGING:  DX-CHEST-PORTABLE (1 VIEW)   Final Result         1.  No acute cardiopulmonary disease.          MEDS:  Current Facility-Administered Medications   Medication Last Admin    fluticasone (Flovent HFA) 44 MCG/ACT inhaler 88 mcg 88 mcg at 09/12/23 0526    tamsulosin (Flomax) capsule 0.4 mg 0.4 mg at 09/11/23 0801    acetaminophen (Tylenol) tablet 1,000 mg 1,000 mg at 09/12/23 0527    Followed by    [START ON 9/15/2023] acetaminophen  (Tylenol) tablet 1,000 mg      Pharmacy Consult Request ...Pain Management Review 1 Each      LORazepam (Ativan) tablet 0.5 mg      LORazepam (Ativan) tablet 1 mg      Or    LORazepam (Ativan) injection 0.5 mg      LORazepam (Ativan) tablet 2 mg      Or    LORazepam (Ativan) injection 1 mg      LORazepam (Ativan) tablet 3 mg      Or    LORazepam (Ativan) injection 1.5 mg      LORazepam (Ativan) tablet 4 mg      Or    LORazepam (Ativan) injection 2 mg      dexamethasone (Decadron) tablet 6 mg 6 mg at 09/12/23 0527    [Held by provider] rivaroxaban (Xarelto) tablet 10 mg 10 mg at 09/09/23 1812    thiamine (Vitamin B-1) tablet 100 mg 100 mg at 09/12/23 0526    erythromycin ophthalmic ointment 1 Application 1 Application at 09/12/23 0525    senna-docusate (Pericolace Or Senokot S) 8.6-50 MG per tablet 2 Tablet 2 Tablet at 09/12/23 0527    And    polyethylene glycol/lytes (Miralax) PACKET 1 Packet      And    magnesium hydroxide (Milk Of Magnesia) suspension 30 mL      And    bisacodyl (Dulcolax) suppository 10 mg      ondansetron (Zofran) syringe/vial injection 4 mg      ondansetron (Zofran ODT) dispertab 4 mg      promethazine (Phenergan) tablet 12.5-25 mg      promethazine (Phenergan) suppository 12.5-25 mg      prochlorperazine (Compazine) injection 5-10 mg      folic acid (Folvite) tablet 1 mg 1 mg at 09/12/23 0526    multivitamin tablet 1 Tablet 1 Tablet at 09/12/23 0526    polymixin-trimethoprim (Polytrim) 99097-1.1 UNIT/ML-% ophthalmic solution 1 Drop 1 Drop at 09/12/23 0525       PROBLEM LIST:  No problems updated.    ASSESSMENT/PLAN: 58 y.o. male with alcohol use disorder and hypoxia from COVID-19 was admitted on 9/7/23 for alcohol dependence with withdrawal delirium.     Alcohol dependence with withdrawal delirium  -Patient treated in the ED with phenobarbital, Lorazepam, and diazepam as well as fluids, thiamine, folic acid, and magnesium supplement. CIWA 27. He was moved to the ICU as he remained agitated  after medication administration and was given IV phenobarbital as well as high dose IV thiamine.   -patient is without hallucinations, agitation, or tremors. Current medications include 1mg folic acid supplement, multivitamin, and thiamine tablet. Recommend continuing these medications outpatient.   -Seek alcohol support groups and abstain from drinking.   COVID-19  -Patient states that he started showing symptoms around 10 days ago but tested himself at home on 9/8/23. Patient denies any shortness of breath but still has some nasal congestion. He is off of the NC O2 and saturating at 92%.  -Currently taking dexamethasone 6mg tab.   -Continue fluticasone inhaler outpatient.   JARVIS  -using nocturnal CPAP/BIPAP  Hypomagnesemia  -Current 2.0.  Thrombocytopenia  -Patient's current platelets are 119 and INR 0.97. He is bleeding from his penis after most likely traumatic batres insertion. Xarelto 10mg being held after bleeding episode. No history of bleeding disorders.   -Patient on Tamsulosin.  -Bladder scan WNL.   -Bleeding has significantly improved.   -Monitor for bleeding outpatient and seek PCP if it continues.   Conjunctivitis   -Patient is taking Polytrim and erythromycin ointment and says that his eyes do feel better but are still pruritic.   Alcohol hepatitis  -His transaminases have returned to normal limits with AST 20 and ALT 32.   Hyponatremia  -Sodium at 138.   Hypokalemia   -Current 3.7.    #Disposition: Patient is medically cleared for discharge.     Core Measures:  Fluids: None  Lines: None  Abx: None  Diet: Regular  PPX: None    CODE STATUS: FULL    ANASTACIO Abbott-S2  [facility]

## 2023-09-13 NOTE — DOCUMENTATION QUERY
[facility]                                                                       Query Response Note      PATIENT:               APRIL SHARP  ACCT #:                  [id number]  MRN:                     [id number]  :                      1965  ADMIT DATE:       2023 7:43 PM  DISCH DATE:        2023 11:45 AM  RESPONDING  PROVIDER #:        [id number]           QUERY TEXT:    Hypoxia with supplemental oxygen is documented in the Medical Record. Can a correlating dx be determined?    The patient's Clinical Indicators include:  59yo with dx of alcohol dependence with withdrawal, COVID 19, hyponatremia, hypoxemia     Epic Nurse VS pulse oximetry 77% RA, RR 24    Epic Nurse VS pulse oximetry 88-95% on 2-4 supplemental oxygen, RR 21-40    Risk factors: age, COVID 19, alcohol withdrawal, hypoxia  Treatments: supplemental oxygen, imaging, decadron, labwork, medications, monitoring    Contact me with questions.     Thank you,  Rose Palafox, VENTURAP, CDI  [email or web address]  Options provided:   -- Acute respiratory failure with hypoxia   -- Other explanation:other explanation-, please specify   -- Unable to determine      Query created by: Rose Palafox on 2023 5:36 AM    RESPONSE TEXT:    Acute respiratory failure with hypoxia          Electronically signed by:  DEREJE MIMS MD 2023 7:29 AM